# Patient Record
Sex: MALE | Race: WHITE | NOT HISPANIC OR LATINO | Employment: FULL TIME | URBAN - METROPOLITAN AREA
[De-identification: names, ages, dates, MRNs, and addresses within clinical notes are randomized per-mention and may not be internally consistent; named-entity substitution may affect disease eponyms.]

---

## 2023-02-08 PROCEDURE — 87086 URINE CULTURE/COLONY COUNT: CPT | Performed by: STUDENT IN AN ORGANIZED HEALTH CARE EDUCATION/TRAINING PROGRAM

## 2023-02-08 PROCEDURE — U0004 COV-19 TEST NON-CDC HGH THRU: HCPCS | Performed by: STUDENT IN AN ORGANIZED HEALTH CARE EDUCATION/TRAINING PROGRAM

## 2023-02-09 ENCOUNTER — TELEPHONE (OUTPATIENT)
Dept: URGENT CARE | Facility: CLINIC | Age: 28
End: 2023-02-09
Payer: MEDICAID

## 2023-02-09 NOTE — TELEPHONE ENCOUNTER
----- Message from John Calvin Cooksey, PA-C sent at 2/9/2023  9:35 AM EST -----  Please call the patient regarding his negative COVID PCR test.     Thank you,     -John Cooksey, PA-C

## 2023-02-10 ENCOUNTER — TELEPHONE (OUTPATIENT)
Dept: URGENT CARE | Facility: CLINIC | Age: 28
End: 2023-02-10
Payer: MEDICAID

## 2023-02-10 NOTE — TELEPHONE ENCOUNTER
Finger Sprain  A sprain is a stretching or tearing of the ligaments that hold a joint together. There are no broken bones. Sprains take 3 to 6 weeks to heal.  A sprained finger may be treated with a splint or buddy tape. This is when you tape the injured finger to the one next to it for support. Minor sprains may require no additional support.  Home care  · Keep your hand elevated to reduce pain and swelling. This is very important during the first 48 hours.  · Apply an ice pack over the injured area for 15 to 20 minutes every 3 to 6 hours. You should do this for the first 24 to 48 hours. You can make an ice pack by filling a plastic bag that seals at the top with ice cubes and then wrapping it with a thin towel. Continue the use of ice packs for relief of pain and swelling as needed. As the ice melts, be careful to avoid getting any wrap or splint wet. After 48 hours, apply heat (warm shower or warm bath) for 15 to 20 minutes several times a day, or alternate ice and heat.  · If buddy tape was applied and it becomes wet or dirty, change it. You may replace it with paper, plastic or cloth tape. Cloth tape and paper tapes must be kept dry. Apply gauze or cotton padding between the fingers, especially at the webbed space. This will help prevent the skin from getting moist and breaking down. Keep the buddy tape in place for at least 4 weeks, or as instructed by your healthcare provider.  · If a splint was applied, wear it for the time advised.  · You may use over-the-counter pain medicine to control pain, unless another pain medicine was prescribed. If you have chronic liver or kidney disease or ever had a stomach ulcer or GI bleeding, talk with your healthcare provider before using these medicines.  Follow-up care  Follow up with your healthcare provider as directed. Finger joints will become stiff if immobile for too long. If a splint was applied, ask your healthcare provider when it is safe to begin  ----- Message from ELIAZAR Sumner sent at 2/10/2023  9:42 AM EST -----  Please call the patient regarding his normal result.    Please inform patient that his urine culture has came back with no growth which means negative.    If patient is continuing to have symptoms or other concerns persist he should follow-up with his primary care provider which is listed as Lisset López.   range-of-motion exercises.  Sometimes fractures don’t show up on the first X-ray. Bruises and sprains can sometimes hurt as much as a fracture. These injuries can take time to heal completely. If your symptoms don’t improve or they get worse, talk with your healthcare provider. You may need a repeat X-ray. If X-rays were taken, you will be told of any new findings that may affect your care.  When to seek medical advice  Call your healthcare provider right away if any of these occur:  · Pain or swelling increases  · Fingers or hand becomes cold, blue, numb, or tingly  Date Last Reviewed: 11/20/2015 © 2000-2018 [employer]. [address] [place] [address], [place] [address]. All rights reserved. This information is not intended as a substitute for professional medical care. Always follow your healthcare professional's instructions.            Head Injury (Adult)    You have a head injury. It does not appear serious at this time. But symptoms of a more serious problem, such as a mild brain injury (concussion) or bruising or bleeding in the brain, may appear later. For this reason, you or someone caring for you will need to watch for the symptoms listed below. Once you’re home, also be sure to follow any care instructions you’re given.  Home care  Watch for the following symptoms  Seek emergency medical care if you have any of these symptoms over the next hours to days:   · Headache  · Nausea or vomiting  · Dizziness  · Sensitivity to light or noise  · Unusual sleepiness or grogginess  · Trouble falling asleep  · Personality changes  · Vision changes  · Memory loss  · Confusion  · Trouble walking or clumsiness  · Loss of consciousness (even for a short time)  · Inability to be awakened  · Stiff neck  · Weakness or numbness in any part of the body  · Seizures  General care  · If you were prescribed medicines for pain, use them as directed. Note: Don’t take other medicines for pain without talking to your provider  first.  · To help reduce swelling and pain, apply a cold source to the injured area for up to 20 minutes at a time. Do this as often as directed. Use a cold pack or bag of ice wrapped in a thin towel. Never apply a cold source directly to the skin.  · If you have cuts or scrapes as a result of your head injury, care for them as directed.  · For the next 24 hours (or longer, if instructed):  ? Don’t drink alcohol or use sedatives or other medicines that make you sleepy.  ? Don’t drive or operate machinery.  ? Don’t do anything strenuous, such as heavy lifting or straining.  ? Limit tasks that require concentration. This includes reading, using a smartphone or computer, watching TV, and playing video games.  ? Don’t return to sports or other activities that could result in another head injury.  Follow-up care  Follow up with your healthcare provider, or as directed. If imaging tests were done, they will be reviewed by a doctor. You will be told the results and any new findings that may affect your care.  When to seek medical advice  Call your healthcare provider right away if any of these occur:  · Pain doesn’t get better or worsens  · New or increased swelling or bruising  · Fever of 100.4°F (38°C) or higher, or as directed by your provider  · Increased redness, warmth, drainage, or bleeding from the injured area  · Fluid drainage or bleeding from the nose or ears  · Any depression or bony abnormality in the injured area  Date Last Reviewed: 9/26/2015  © 0247-1268 [employer]. [address] [place] [address], [place] [address]. All rights reserved. This information is not intended as a substitute for professional medical care. Always follow your healthcare professional's instructions.        ACE Wrap  Minor muscle or joint injuries are often treated with an elastic bandage. The bandage provides support and compression to the injured area. An elastic bandage is a stretchy, rolled bandage. Elastic bandages range  in width from 2 to 6 inches. They can be used for a variety of injuries. The bandages are often called ACE bandages, after the most common brand name.  If used correctly, elastic bandages help control swelling and ease pain. An elastic bandage is also a good reminder not to overuse the injured area. However, elastic bandages do not provide a lot of support and will not prevent reinjury.  Home care    To apply an elastic bandage:  · Check the skin before wrapping the injury. It should be clean, dry, and free of drainage.  · Start wrapping below the injury and work your way toward the body. For an ankle sprain, start wrapping around the foot and work up toward the calf. This will help control swelling.  · Overlap the edges of the bandage so it stays snuggly in place.  · Wrap the bandage firmly, but not too tightly. A tight bandage can increase swelling on either end of the bandage. Make sure the bandage is wrinkle free.  · Leave fingers and toes exposed.  · Secure ends of the bandage (even self-sticking ones) with clips or tape.  · Check frequently to ensure adequate circulation, especially in the fingers and toes. Loosen the bandage if there is local swelling, numbness, tingling, discomfort, coldness, or discoloration (skin pale or bluish in color).  · Rewrap the bandage as needed during the day. Reroll the bandage as you unwind it.  Continue using the elastic bandage until the pain and swelling are gone or as your healthcare provider advises.  If you have been told to ice the area, the ice can be secured in place with the elastic bandage. Wrap the ice pack with a thin towel to protect the skin. Do not put ice or an ice pack directly on the skin.  Ice the area for no more than 20 minutes at a time.    Follow-up care  Follow up with your healthcare provider, as advised.  When to seek medical advice  Call your healthcare provider for any of the following:  · Pain and swelling that doesn't get better or gets  worse  · Trouble moving injured area  · Skin discoloration, numbness, or tingling that doesn’t go away after bandage is removed  Date Last Reviewed: 9/13/2015  © 3637-0034 [employer], [employer]. [address] [place] [address], [place] [address]. All rights reserved. This information is not intended as a substitute for professional medical care. Always follow your healthcare professional's instructions.

## 2023-02-11 ENCOUNTER — HOSPITAL ENCOUNTER (EMERGENCY)
Facility: HOSPITAL | Age: 28
Discharge: HOME | End: 2023-02-11
Attending: EMERGENCY MEDICINE | Admitting: EMERGENCY MEDICINE
Payer: MEDICAID

## 2023-02-11 ENCOUNTER — APPOINTMENT (OUTPATIENT)
Dept: GENERAL RADIOLOGY | Facility: HOSPITAL | Age: 28
End: 2023-02-11
Payer: MEDICAID

## 2023-02-11 VITALS
SYSTOLIC BLOOD PRESSURE: 124 MMHG | TEMPERATURE: 98.4 F | HEART RATE: 81 BPM | HEIGHT: 71 IN | BODY MASS INDEX: 32.56 KG/M2 | WEIGHT: 232.59 LBS | OXYGEN SATURATION: 99 % | DIASTOLIC BLOOD PRESSURE: 85 MMHG | RESPIRATION RATE: 16 BRPM

## 2023-02-11 DIAGNOSIS — R19.7 DIARRHEA, UNSPECIFIED TYPE: ICD-10-CM

## 2023-02-11 DIAGNOSIS — R11.0 NAUSEA: ICD-10-CM

## 2023-02-11 DIAGNOSIS — R51.9 INTRACTABLE HEADACHE, UNSPECIFIED CHRONICITY PATTERN, UNSPECIFIED HEADACHE TYPE: Primary | ICD-10-CM

## 2023-02-11 LAB
FLUAV AG NPH QL: NEGATIVE
FLUBV AG NPH QL IA: NEGATIVE
S PYO AG THROAT QL: NEGATIVE
SARS-COV-2 RNA RESP QL NAA+PROBE: NOT DETECTED

## 2023-02-11 PROCEDURE — 99283 EMERGENCY DEPT VISIT LOW MDM: CPT

## 2023-02-11 PROCEDURE — 25010000002 DIPHENHYDRAMINE PER 50 MG

## 2023-02-11 PROCEDURE — 87081 CULTURE SCREEN ONLY: CPT | Performed by: EMERGENCY MEDICINE

## 2023-02-11 PROCEDURE — 96361 HYDRATE IV INFUSION ADD-ON: CPT

## 2023-02-11 PROCEDURE — U0004 COV-19 TEST NON-CDC HGH THRU: HCPCS | Performed by: EMERGENCY MEDICINE

## 2023-02-11 PROCEDURE — 25010000002 PROCHLORPERAZINE 10 MG/2ML SOLUTION

## 2023-02-11 PROCEDURE — 71045 X-RAY EXAM CHEST 1 VIEW: CPT

## 2023-02-11 PROCEDURE — 87880 STREP A ASSAY W/OPTIC: CPT | Performed by: EMERGENCY MEDICINE

## 2023-02-11 PROCEDURE — 96374 THER/PROPH/DIAG INJ IV PUSH: CPT

## 2023-02-11 PROCEDURE — 25010000002 KETOROLAC TROMETHAMINE PER 15 MG

## 2023-02-11 PROCEDURE — 96375 TX/PRO/DX INJ NEW DRUG ADDON: CPT

## 2023-02-11 PROCEDURE — 87804 INFLUENZA ASSAY W/OPTIC: CPT | Performed by: EMERGENCY MEDICINE

## 2023-02-11 PROCEDURE — C9803 HOPD COVID-19 SPEC COLLECT: HCPCS | Performed by: EMERGENCY MEDICINE

## 2023-02-11 RX ORDER — NAPROXEN 500 MG/1
500 TABLET ORAL 2 TIMES DAILY PRN
Qty: 30 TABLET | Refills: 0 | Status: SHIPPED | OUTPATIENT
Start: 2023-02-11

## 2023-02-11 RX ORDER — DIPHENHYDRAMINE HYDROCHLORIDE 50 MG/ML
12.5 INJECTION INTRAMUSCULAR; INTRAVENOUS ONCE
Status: COMPLETED | OUTPATIENT
Start: 2023-02-11 | End: 2023-02-11

## 2023-02-11 RX ORDER — KETOROLAC TROMETHAMINE 30 MG/ML
30 INJECTION, SOLUTION INTRAMUSCULAR; INTRAVENOUS ONCE
Status: COMPLETED | OUTPATIENT
Start: 2023-02-11 | End: 2023-02-11

## 2023-02-11 RX ORDER — PROCHLORPERAZINE EDISYLATE 5 MG/ML
10 INJECTION INTRAMUSCULAR; INTRAVENOUS ONCE
Status: COMPLETED | OUTPATIENT
Start: 2023-02-11 | End: 2023-02-11

## 2023-02-11 RX ORDER — BUTALBITAL, ACETAMINOPHEN AND CAFFEINE 300; 40; 50 MG/1; MG/1; MG/1
1 CAPSULE ORAL ONCE
Status: COMPLETED | OUTPATIENT
Start: 2023-02-11 | End: 2023-02-11

## 2023-02-11 RX ADMIN — KETOROLAC TROMETHAMINE 30 MG: 30 INJECTION, SOLUTION INTRAMUSCULAR; INTRAVENOUS at 13:00

## 2023-02-11 RX ADMIN — BUTALBITAL, ACETAMINOPHEN AND CAFFEINE 1 CAPSULE: 300; 40; 50 CAPSULE ORAL at 14:36

## 2023-02-11 RX ADMIN — DIPHENHYDRAMINE HYDROCHLORIDE 12.5 MG: 50 INJECTION, SOLUTION INTRAMUSCULAR; INTRAVENOUS at 12:58

## 2023-02-11 RX ADMIN — PROCHLORPERAZINE EDISYLATE 10 MG: 5 INJECTION INTRAMUSCULAR; INTRAVENOUS at 12:56

## 2023-02-11 RX ADMIN — SODIUM CHLORIDE 1000 ML: 9 INJECTION, SOLUTION INTRAVENOUS at 12:55

## 2023-02-11 NOTE — ED PROVIDER NOTES
Emergency Department Encounter    Date seen: 2/11/2023  Time: 12:27 PM EST    Room number: 13/13     Chief Complaint: flu like symptoms     HPI    History of Present Illness:  Patient is a 27 y.o. year old male who presents to the emergency department for evaluation of flulike symptoms, cough, fever, and a headache.  Patient reports he has had a dull headache for 3 weeks.  He describes it as a pressure sensation behind his eyes.  He states his fever has reached as high as 102.0.    Independent Historian/Clinical History and Information was obtained by:   Patient     History is limited by: N/A      PCP: Lisset López APRN        Past Medical History:     Allergies   Allergen Reactions   • Ceclor [Cefaclor] Unknown - Low Severity     Not sure what his reaction is, had as a kid.     Past Medical History:   Diagnosis Date   • ADHD      Past Surgical History:   Procedure Laterality Date   • HERNIA REPAIR       History reviewed. No pertinent family history.    Home Medications:  Prior to Admission medications    Medication Sig Start Date End Date Taking? Authorizing Provider   amphetamine-dextroamphetamine (ADDERALL) 20 MG tablet Take 20 mg by mouth Daily.    Provider, MD Lashanda   methylPREDNISolone (MEDROL) 4 MG dose pack Take as directed on package instructions. 2/8/23   Cooksey, John Calvin, PA-C        Social History:   Social History     Tobacco Use   • Smoking status: Some Days     Types: Cigarettes   • Smokeless tobacco: Never   Vaping Use   • Vaping Use: Never used   Substance Use Topics   • Alcohol use: Yes     Comment: on occ       All labs were reviewed and interpreted by me.      Review of Systems:  Review of Systems   Constitutional: Negative for chills and fever.   HENT: Negative for congestion, ear pain and sore throat.    Eyes: Positive for photophobia. Negative for pain.   Respiratory: Positive for cough. Negative for chest tightness and shortness of breath.    Cardiovascular: Negative for chest  "pain.   Gastrointestinal: Positive for diarrhea and nausea. Negative for abdominal pain and vomiting.   Genitourinary: Negative for flank pain and hematuria.   Musculoskeletal: Negative for joint swelling.   Skin: Negative for pallor.   Neurological: Positive for headaches. Negative for seizures.   All other systems reviewed and are negative.       Physical Exam:  /85 (BP Location: Left arm, Patient Position: Lying)   Pulse 81   Temp 98.4 °F (36.9 °C) (Oral)   Resp 16   Ht 180.3 cm (70.98\")   Wt 105 kg (232 lb 9.4 oz)   SpO2 99%   BMI 32.45 kg/m²     Physical Exam  Vitals and nursing note reviewed.   Constitutional:       General: He is not in acute distress.     Appearance: Normal appearance. He is not ill-appearing, toxic-appearing or diaphoretic.   HENT:      Head: Normocephalic and atraumatic.      Mouth/Throat:      Mouth: Mucous membranes are moist.   Eyes:      General: No scleral icterus.     Extraocular Movements: Extraocular movements intact.      Pupils: Pupils are equal, round, and reactive to light.   Cardiovascular:      Rate and Rhythm: Normal rate and regular rhythm.      Pulses: Normal pulses.      Heart sounds: Normal heart sounds.   Pulmonary:      Effort: Pulmonary effort is normal. No respiratory distress.      Breath sounds: Normal breath sounds. No stridor. No wheezing, rhonchi or rales.   Chest:      Chest wall: No tenderness.   Abdominal:      General: Abdomen is flat. There is no distension.      Palpations: Abdomen is soft.      Tenderness: There is no abdominal tenderness.   Musculoskeletal:         General: Normal range of motion.      Cervical back: Normal range of motion and neck supple.   Skin:     General: Skin is warm and dry.      Coloration: Skin is not jaundiced or pale.      Findings: No bruising, erythema, lesion or rash.   Neurological:      General: No focal deficit present.      Mental Status: He is alert and oriented to person, place, and time. Mental status is " at baseline.      Sensory: No sensory deficit.      Motor: No weakness.      Coordination: Coordination normal.   Psychiatric:         Mood and Affect: Mood normal.         Behavior: Behavior normal.         Thought Content: Thought content normal.         Judgment: Judgment normal.                  Procedures:  Procedures      Medical Decision Making:      Comorbidities that affect care:    pt has history of headaches    External Notes reviewed:    Previous Clinic Note: Patient was seen at a local urgent care center on 2/8/2023, 3 days ago for suprapubic tenderness.  He was also seen for a cough at that facility.      The following orders were placed and all results were independently analyzed by me:  Orders Placed This Encounter   Procedures   • Influenza Antigen, Rapid - Swab, Nasopharynx   • Rapid Strep A Screen - Swab, Throat   • COVID-19,APTIMA PANTHER(SANDRA),[facility], NP/OP SWAB IN UTM/VTM/SALINE TRANSPORT MEDIA,24 HR TAT - Swab, Nasopharynx   • Beta Strep Culture, Throat - Swab, Throat   • XR Chest 1 View       Medications Given in the Emergency Department:  Medications   ketorolac (TORADOL) injection 30 mg (30 mg Intravenous Given 2/11/23 1300)   prochlorperazine (COMPAZINE) injection 10 mg (10 mg Intravenous Given 2/11/23 1256)   diphenhydrAMINE (BENADRYL) injection 12.5 mg (12.5 mg Intravenous Given 2/11/23 1258)   sodium chloride 0.9 % bolus 1,000 mL (0 mL Intravenous Stopped 2/11/23 1437)   butalbital-acetaminophen-caffeine (ORBIVAN) -40 MG capsule 1 capsule (1 capsule Oral Given 2/11/23 1436)        ED Course:    ED Course as of 02/11/23 1606   Sat Feb 11, 2023   1327 Patient was sleeping upon me entering the room for reevaluation.  He was easy to arouse.  Once awake he reports minimal decrease in pain. [MS]   1537 Pt now rates his pain as a 2. [MS]      ED Course User Index  [MS] Narcisa López APRN       Labs:    Lab Results (last 24 hours)     Procedure Component Value Units  Date/Time    Influenza Antigen, Rapid - Swab, Nasopharynx [[id number]]  (Normal) Collected: 02/11/23 1229    Specimen: Swab from Nasopharynx Updated: 02/11/23 1305     Influenza A Ag, EIA Negative     Influenza B Ag, EIA Negative    Rapid Strep A Screen - Swab, Throat [[id number]]  (Normal) Collected: 02/11/23 1229    Specimen: Swab from Throat Updated: 02/11/23 1249     Strep A Ag Negative    COVID-19,APTIMA PANTHER(SANDRA),[facility], NP/OP SWAB IN UTM/VTM/SALINE TRANSPORT MEDIA,24 HR TAT - Swab, Nasopharynx [[id number]] Collected: 02/11/23 1229    Specimen: Swab from Nasopharynx Updated: 02/11/23 1231    Beta Strep Culture, Throat - Swab, Throat [[id number]] Collected: 02/11/23 1229    Specimen: Swab from Throat Updated: 02/11/23 1249           Imaging:    XR Chest 1 View    Result Date: 2/11/2023  PROCEDURE: XR CHEST 1 VW  COMPARISON: None  INDICATIONS: INTERMITTENT FEVER, WEAKNESS  FINDINGS:  LUNGS: Normal.  No significant pulmonary parenchymal abnormalities.  VASCULATURE: Normal.  Unremarkable pulmonary vasculature.  CARDIAC: Normal.  No cardiac silhouette abnormality or cardiomegaly.  MEDIASTINUM: Normal.  No visible mass or adenopathy.  PLEURA: Normal.  No effusion or pleural thickening.  BONES: Normal.  No fracture or visible bony lesion.  OTHER: Negative.        Normal examination.        BALAJI JHA MD       Electronically Signed and Approved By: BALAJI JHA MD on 2/11/2023 at 13:36                 Differential Diagnosis and Discussion:    Headache: Differential diagnosis includes but is not limited to migraine, cluster headache, hypertension, tumor, subarachnoid bleeding, pseudotumor cerebri, temporal arteritis, infections, tension headache, and TMJ syndrome.        Patient Care Considerations:    CT HEAD: I considered ordering a noncontrast CT of the head, however Patient has no neurodeficits, no unilateral weakness, no head trauma, and additionally his headache is lasted for 3 weeks and he has a  history of having headaches      Consultants/Shared Management Plan:    None    Social Determinants of Health:    Patient is independent, reliable, and has access to care.       Disposition and Care Coordination:    Discharged: The patient is suitable and stable for discharge with no need for consideration of observation or admission.        MDM  Number of Diagnoses or Management Options  Diarrhea, unspecified type: new and does not require workup  Intractable headache, unspecified chronicity pattern, unspecified headache type: new and does not require workup  Nausea: new and does not require workup     Amount and/or Complexity of Data Reviewed  Clinical lab tests: reviewed and ordered  Tests in the radiology section of CPT®: reviewed and ordered  Review and summarize past medical records: yes (I have personally reviewed patient's previous medical encounters.  )    Risk of Complications, Morbidity, and/or Mortality  Presenting problems: low  Diagnostic procedures: low  Management options: low    Patient Progress  Patient progress: stable       Final diagnoses:   Intractable headache, unspecified chronicity pattern, unspecified headache type   Nausea   Diarrhea, unspecified type        ED Disposition     ED Disposition   Discharge    Condition   Stable    Comment   --             This medical record created using voice recognition software.                     Narcisa López, APRN  02/11/23 4924

## 2023-02-13 LAB — BACTERIA SPEC AEROBE CULT: NORMAL

## 2023-06-18 ENCOUNTER — TELEPHONE (OUTPATIENT)
Dept: URGENT CARE | Facility: CLINIC | Age: 28
End: 2023-06-18
Payer: MEDICAID

## 2023-06-18 DIAGNOSIS — H66.001 NON-RECURRENT ACUTE SUPPURATIVE OTITIS MEDIA OF RIGHT EAR WITHOUT SPONTANEOUS RUPTURE OF TYMPANIC MEMBRANE: Primary | ICD-10-CM

## 2023-06-18 RX ORDER — AZITHROMYCIN 250 MG/1
TABLET, FILM COATED ORAL
Qty: 6 TABLET | Refills: 0 | Status: SHIPPED | OUTPATIENT
Start: 2023-06-18

## 2023-10-18 ENCOUNTER — TELEPHONE (OUTPATIENT)
Dept: OTOLARYNGOLOGY | Facility: CLINIC | Age: 28
End: 2023-10-18

## 2023-10-18 NOTE — TELEPHONE ENCOUNTER
[facility] received a fax that requires your attention. The document has been indexed to the patient’s chart for your review.      Reason for sending: FOR PROVIDER REVIEW    Documents Description: CT SINUS 10/12/2023    Name of Sender: [facility]    Date Indexed: 10/12/2023    Notes (if needed):

## 2023-12-11 ENCOUNTER — OFFICE VISIT (OUTPATIENT)
Dept: OTOLARYNGOLOGY | Facility: CLINIC | Age: 28
End: 2023-12-11
Payer: COMMERCIAL

## 2023-12-11 ENCOUNTER — PREP FOR SURGERY (OUTPATIENT)
Dept: OTHER | Facility: HOSPITAL | Age: 28
End: 2023-12-11
Payer: COMMERCIAL

## 2023-12-11 VITALS — BODY MASS INDEX: 30.35 KG/M2 | HEIGHT: 71 IN | WEIGHT: 216.8 LBS | TEMPERATURE: 97.6 F

## 2023-12-11 DIAGNOSIS — J32.9 CHRONIC SINUSITIS, UNSPECIFIED LOCATION: Primary | ICD-10-CM

## 2023-12-11 DIAGNOSIS — J34.89 CONCHA BULLOSA: ICD-10-CM

## 2023-12-11 DIAGNOSIS — J30.89 ALLERGIC RHINITIS DUE TO OTHER ALLERGIC TRIGGER, UNSPECIFIED SEASONALITY: ICD-10-CM

## 2023-12-11 DIAGNOSIS — H57.11 EYE PAIN, RIGHT: ICD-10-CM

## 2023-12-11 DIAGNOSIS — J34.3 HYPERTROPHY OF BOTH INFERIOR NASAL TURBINATES: ICD-10-CM

## 2023-12-11 DIAGNOSIS — J34.2 DEVIATED NASAL SEPTUM: ICD-10-CM

## 2023-12-11 DIAGNOSIS — R09.81 CHRONIC NASAL CONGESTION: ICD-10-CM

## 2023-12-11 DIAGNOSIS — J32.9 CHRONIC SINUSITIS, UNSPECIFIED LOCATION: ICD-10-CM

## 2023-12-11 DIAGNOSIS — Z01.818 PREOP TESTING: Primary | ICD-10-CM

## 2023-12-11 DIAGNOSIS — R09.81 CHRONIC NASAL CONGESTION: Primary | ICD-10-CM

## 2023-12-11 PROBLEM — J30.9 ALLERGIC RHINITIS: Status: ACTIVE | Noted: 2023-12-11

## 2023-12-11 PROCEDURE — 99204 OFFICE O/P NEW MOD 45 MIN: CPT | Performed by: OTOLARYNGOLOGY

## 2023-12-11 RX ORDER — CETIRIZINE HCL, PSEUDOEPHEDRINE HCL 5; 120 MG/1; MG/1
TABLET, EXTENDED RELEASE ORAL
COMMUNITY
Start: 2023-08-15

## 2023-12-11 RX ORDER — OXYMETAZOLINE HYDROCHLORIDE 0.05 G/100ML
2 SPRAY NASAL 2 TIMES DAILY
OUTPATIENT
Start: 2024-01-05 | End: 2024-01-06

## 2023-12-11 RX ORDER — DEXTROAMPHETAMINE SACCHARATE, AMPHETAMINE ASPARTATE, DEXTROAMPHETAMINE SULFATE AND AMPHETAMINE SULFATE 2.5; 2.5; 2.5; 2.5 MG/1; MG/1; MG/1; MG/1
TABLET ORAL EVERY 12 HOURS SCHEDULED
COMMUNITY

## 2023-12-11 RX ORDER — TOBRAMYCIN 3 MG/ML
SOLUTION/ DROPS OPHTHALMIC
COMMUNITY
Start: 2023-06-26

## 2023-12-11 RX ORDER — CITALOPRAM 20 MG/1
1 TABLET ORAL DAILY
COMMUNITY
Start: 2023-11-27

## 2023-12-11 NOTE — PATIENT INSTRUCTIONS
1.  Patient's chronic sinusitis symptoms come from the nasal airway issues caused by deviated nasal septum, hi bullosa, bilateral large inferior turbinates.  Also on top of this patient has a significant valve collapse which is adding to his nasal airway issues.  Therefore I recommend patient have septoplasty, bilateral inferior turbinate submucous resection with cauterization, endoscopic hi bullectomy and of course nasal valve repair with Latera implants.    SEPTOPLASTY, NASAL VALVE REPAIR AND TURBINOPLASTY: A septoplasty and inferior turbinoplasty were recommended. The risks and benefits were explained including but not limited to pain, bleeding, infection, implant failure or extrusion, risks of the general anesthesia, continued septal deviation, crusting, congestion and septal perforation. Possibilities of continued preoperative symptoms and the possible need for revision surgery and or medical therapy were discussed. Alternatives were discussed. No guarantees were made or implied. Questions were asked appropriately answered.     2.  Patient to have his blood work done prior to surgery.

## 2023-12-11 NOTE — PROGRESS NOTES
Patient Name: Mario King   Visit Date: 12/11/2023   Patient ID: [id number]  Provider: Ortiz Wilcox MD    Sex: male  Location: [facility]   YOB: 1995  Location Address: [address] [place] [address], [address] [place] [address],?[place]?[id number]    Primary Care Provider Lisset López APRN  Location Phone: [phone]    Referring Provider: No ref. provider found        Chief Complaint  Sinus Problem    History of Present Illness  Mario King is a 28 y.o. male who presents to [facility] for Sinus Problem.  Patient reports that he has been having chronic sinusitis for almost all his adult life.  Patient reports that he has had nasal congestion always seem to be worse on the right compared to the left.  He also has thick drainage that is usually brown or yellowish in color.  He tries sinus rinses including nebs over the years.  Patient also has been treated for infections with antibiotics and most recently in the past year he has had 8 infections that he actually knows he has been treated for.  Also he has some allergies which is mild but certainly it includes on prior testing positive for ragweed [place] grass cedar and [place] trees.  CT scan of the sinuses was obtained on October 12, 2023 at the [facility].  Repairs on that particular CT scan the sinuses were clear although he had deviated nasal septum, hi bullosa, inferior turbinate hypertrophy but no evidence of any sinus disease.  Also personally reviewed the CT scan which confirms the above findings of basically having a deviated nasal septum which is a largely due to hi bullosa and then of course the large inferior turbinate.    Past Medical History:   Diagnosis Date    ADHD        Past Surgical History:   Procedure Laterality Date    HERNIA REPAIR           Current Outpatient Medications:     amphetamine-dextroamphetamine (ADDERALL) 20 MG tablet, Take 1 tablet by mouth 2 (Two) Times a Day.,  "Disp: , Rfl:     citalopram (CeleXA) 20 MG tablet, Take 1 tablet by mouth Daily., Disp: , Rfl:     ibuprofen (ADVIL,MOTRIN) 200 MG tablet, Take 1 tablet by mouth Every 6 (Six) Hours As Needed for Mild Pain., Disp: , Rfl:     prazosin (MINIPRESS) 2 MG capsule, Take 1 capsule by mouth every night at bedtime., Disp: , Rfl:     tobramycin 0.3 % solution ophthalmic solution, 1 gtt in each affected eye Three Times daily for 10 day(s), Disp: , Rfl:     amphetamine-dextroamphetamine (Adderall) 10 MG tablet, Every 12 (Twelve) Hours. (Patient not taking: Reported on 12/11/2023), Disp: , Rfl:     Dextromethorphan-guaiFENesin 5-100 MG/5ML liquid, Every 4 (Four) Hours. (Patient not taking: Reported on 12/11/2023), Disp: , Rfl:     diphenhydrAMINE (BENADRYL) 25 mg capsule, Take 1 capsule by mouth Every 6 (Six) Hours As Needed for Itching. (Patient not taking: Reported on 12/11/2023), Disp: , Rfl:     GNP All Day Allergy-D 5-120 MG per 12 hr tablet, TAKE 1 TABLET BY MOUTH EVERY MORNING AND TAKE 1 TABLET BY MOUTH EVERY EVENING (Patient not taking: Reported on 12/11/2023), Disp: , Rfl:      Allergies   Allergen Reactions    Ceclor [Cefaclor] Unknown - Low Severity     Not sure what his reaction is, had as a kid.       Social History     Tobacco Use    Smoking status: Never     Passive exposure: Past    Smokeless tobacco: Never   Vaping Use    Vaping Use: Never used   Substance Use Topics    Alcohol use: Yes     Comment: on occ    Drug use: Never        Objective     Vital Signs:   Vitals:    12/11/23 1314   Temp: 97.6 °F (36.4 °C)   TempSrc: Temporal   Weight: 98.3 kg (216 lb 12.8 oz)   Height: 179.1 cm (70.5\")       Tobacco Use: Low Risk  (12/11/2023)    Patient History     Smoking Tobacco Use: Never     Smokeless Tobacco Use: Never     Passive Exposure: Past         Physical Exam    Constitutional   Appearance  well developed, well-nourished, alert and in no acute distress, voice clear and strong    Head   Inspection  no " deformities or lesions      Face   Inspection  no facial lesions; House-Brackmann I/VI bilaterally   Palpation  no TMJ crepitus nor  muscle tenderness bilaterally     Eyes/Vision   Visual Fields  extraocular movements are intact, no spontaneous or gaze-induced nystagmus  Conjunctivae  clear   Sclerae  clear   Pupils and Irises  pupils equal, round, and reactive to light.   Nystagmus  not present     Ears, Nose, Mouth and Throat  Ears  External Ears  appearance within normal limits, no lesions present   Otoscopic Examination  tympanic membrane appearance within normal limits bilaterally without perforations, well-aerated middle ears   Hearing  intact to conversational voice both ears   Tunning fork testing    Rinne:  Barney:    Nose  External Nose  appearance normal   Intranasal Exam  Nasal tip caudally is deviated to the left of the columella.  Otherwise septum is deviated with significant congestion of both nostrils with enlarged turbinates.  Modified Rita Test: Positive test with a significant nasal valve collapse due to a very deficient upper lateral cartilages.    Oral Cavity  Oral Mucosa  oral mucosa normal without pallor or cyanosis   Lips  lip appearance normal   Teeth  normal dentition for age   Gums  gums pink, non-swollen, no bleeding present   Tongue  tongue appearance normal; normal mobility   Palate  hard palate normal, soft palate appearance normal with symmetric mobility     Throat  Oropharynx  no inflammation or lesions present, tonsils within normal limits   Hypopharynx  appearance within normal limits   Larynx  voice normal     Neck  Inspection/Palpation  normal appearance, no masses or tenderness, trachea midline; thyroid size normal, nontender, no nodules or masses present on palpation     Lymphatic  Neck  no lymphadenopathy present   Supraclavicular Nodes  no lymphadenopathy present   Preauricular Nodes  no lymphadenopathy present     Respiratory  Respiratory Effort  breathing  "unlabored   Inspection of Chest  normal appearance, no retractions     Musculoskeletal   Cervical back: Normal range of motion and neck supple.      Skin and Subcutaneous Tissue  General Inspection  Regarding face and neck - there are no rashes present, no lesions present, and no areas of discoloration     Neurologic  Cranial Nerves  cranial nerves II-XII are grossly intact bilaterally   Gait and Station  normal gait, able to stand without diffculty    Psychiatric  Judgement and Insight  judgment and insight intact   Mood and Affect  mood normal, affect appropriate       RESULTS REVIEWED    I have reviewed the following information:   []  Previous Internal Note  [x]  Previous External Note:   [x]  Ordered Tests & Results:      Pathology:      No results found for: \"TSH\", \"T3FREE\", \"FREET4\", \"PTH\", \"THYROGLB\", \"CALCIUM\", \"KAML12ZJ\", \"THYRSTIMIMMU\", \"THYROIDAB\"    No Images in the past 120 days found..    I have discussed the interpretation of the above results with the patient.    Procedures          Assessment and Plan   Diagnoses and all orders for this visit:    1. Chronic sinusitis, unspecified location (Primary)    2. Eye pain, right    3. Chronic nasal congestion    4. Deviated nasal septum    5. Hi bullosa    6. Allergic rhinitis due to other allergic trigger, unspecified seasonality        Mario King  reports that he has never smoked. He has been exposed to tobacco smoke. He has never used smokeless tobacco. I have educated him on the risk of diseases from using tobacco products such as Cancer, COPD & Heart Disease.       Plan:  Patient Instructions   1.  Patient's chronic sinusitis symptoms come from the nasal airway issues caused by deviated nasal septum, hi bullosa, bilateral large inferior turbinates.  Also on top of this patient has a significant valve collapse which is adding to his nasal airway issues.  Therefore I recommend patient have septoplasty, bilateral inferior turbinate submucous " resection with cauterization, endoscopic hi bullectomy and of course nasal valve repair with Latera implants.    SEPTOPLASTY, NASAL VALVE REPAIR AND TURBINOPLASTY: A septoplasty and inferior turbinoplasty were recommended. The risks and benefits were explained including but not limited to pain, bleeding, infection, implant failure or extrusion, risks of the general anesthesia, continued septal deviation, crusting, congestion and septal perforation. Possibilities of continued preoperative symptoms and the possible need for revision surgery and or medical therapy were discussed. Alternatives were discussed. No guarantees were made or implied. Questions were asked appropriately answered.     2.  Patient to have his blood work done prior to surgery.     55 minutes were spent caring for Mario on this date of service. This time spent by me includes preparing for the visit, reviewing tests, obtaining/reviewing separately obtained history, performing medically appropriate exam/evaluation, counseling/educating the patient/family/caregiver, ordering medications/tests/procedures, referring/communicating with other health care professionals, documenting information in the medical record, independently interpreting results and communicating that with the patient/family/caregiver and/or care coordination.       Follow Up   Return Postop appointment 1 week and 1 month.  Patient was given instructions and counseling regarding his condition or for health maintenance advice. Please see specific information pulled into the AVS if appropriate.

## 2023-12-29 ENCOUNTER — LAB (OUTPATIENT)
Dept: LAB | Facility: HOSPITAL | Age: 28
End: 2023-12-29
Payer: COMMERCIAL

## 2023-12-29 DIAGNOSIS — Z01.818 PREOP TESTING: ICD-10-CM

## 2023-12-29 LAB
APTT PPP: 28.4 SECONDS (ref 24.2–34.2)
BASOPHILS # BLD AUTO: 0.03 10*3/MM3 (ref 0–0.2)
BASOPHILS NFR BLD AUTO: 0.6 % (ref 0–1.5)
DEPRECATED RDW RBC AUTO: 36.7 FL (ref 37–54)
EOSINOPHIL # BLD AUTO: 0.1 10*3/MM3 (ref 0–0.4)
EOSINOPHIL NFR BLD AUTO: 1.9 % (ref 0.3–6.2)
ERYTHROCYTE [DISTWIDTH] IN BLOOD BY AUTOMATED COUNT: 12.3 % (ref 12.3–15.4)
HCT VFR BLD AUTO: 47.1 % (ref 37.5–51)
HGB BLD-MCNC: 15.2 G/DL (ref 13–17.7)
IMM GRANULOCYTES # BLD AUTO: 0.01 10*3/MM3 (ref 0–0.05)
IMM GRANULOCYTES NFR BLD AUTO: 0.2 % (ref 0–0.5)
INR PPP: 0.91 (ref 0.86–1.15)
LYMPHOCYTES # BLD AUTO: 1.99 10*3/MM3 (ref 0.7–3.1)
LYMPHOCYTES NFR BLD AUTO: 38.3 % (ref 19.6–45.3)
MCH RBC QN AUTO: 26.8 PG (ref 26.6–33)
MCHC RBC AUTO-ENTMCNC: 32.3 G/DL (ref 31.5–35.7)
MCV RBC AUTO: 83.1 FL (ref 79–97)
MONOCYTES # BLD AUTO: 0.55 10*3/MM3 (ref 0.1–0.9)
MONOCYTES NFR BLD AUTO: 10.6 % (ref 5–12)
NEUTROPHILS NFR BLD AUTO: 2.52 10*3/MM3 (ref 1.7–7)
NEUTROPHILS NFR BLD AUTO: 48.4 % (ref 42.7–76)
NRBC BLD AUTO-RTO: 0 /100 WBC (ref 0–0.2)
PLATELET # BLD AUTO: 238 10*3/MM3 (ref 140–450)
PMV BLD AUTO: 9.4 FL (ref 6–12)
PROTHROMBIN TIME: 12.4 SECONDS (ref 11.8–14.9)
RBC # BLD AUTO: 5.67 10*6/MM3 (ref 4.14–5.8)
WBC NRBC COR # BLD AUTO: 5.2 10*3/MM3 (ref 3.4–10.8)

## 2023-12-29 PROCEDURE — 85610 PROTHROMBIN TIME: CPT

## 2023-12-29 PROCEDURE — 85025 COMPLETE CBC W/AUTO DIFF WBC: CPT

## 2023-12-29 PROCEDURE — 85730 THROMBOPLASTIN TIME PARTIAL: CPT

## 2023-12-29 PROCEDURE — 36415 COLL VENOUS BLD VENIPUNCTURE: CPT

## 2023-12-29 NOTE — PRE-PROCEDURE INSTRUCTIONS
PATIENT INSTRUCTED TO BE:    - NOTHING TO EAT AFTER MIDNIGHT OR CHEW, EXCEPT CAN HAVE CLEAR LIQUIDS 2 HOURS PRIOR TO SURGERY ARRIVAL TIME     - TO HOLD ALL VITAMINS, SUPPLEMENTS, NSAIDS FOR ONE WEEK PRIOR TO THEIR SURGICAL PROCEDURE    - DO NOT TAKE ____-------------- 7 DAYS PRIOR TO PROCEDURE PER ANESTHESIA RECOMMENDATIONS/INSTRUCTIONS     - INSTRUCTED PT TO USE SURGICAL SOAP 1 TIME THE NIGHT PRIOR TO SURGERY OR THE AM OF SURGERY.   USE SOAP FROM NECK TO TOES AVOID THEIR FACE, HAIR, AND PRIVATE PARTS. INSTRUCTED NO LOTIONS, JEWELRY, PIERCINGS, OR DEODORANT DAY OF SURGERY    - IF DIABETIC, CHECK BLOOD GLUCOSE IF LESS THAN 70 OR HAVING SYMPTOMS CALL THE PREOP AREA FOR INSTRUCTIONS ON AM OF SURGERY ([phone])    -INSTRUCTED TO TAKE THE FOLLOWING MEDICATIONS THE DAY OF SURGERY:             ADDERALL, CELEXA      - DO NOT BRING ANY MEDICATIONS WITH YOU TO THE HOSPITAL THE DAY OF SURGERY, EXCEPT IF USE INHALERS. BRING INHALERS DAY OF SURGERY       - BRING CPAP OR BIPAP TO THE HOSPITAL ONLY IF ARE SPENDING THE NIGHT    - DO NOT SMOKE OR VAPE 24 HOURS PRIOR TO PROCEDURE PER ANESTHESIA REQUEST     -MAKE SURE YOU HAVE A RIDE HOME OR SOMEONE TO STAY WITH YOU THE DAY OF THE PROCEDURE AFTER YOU GO HOME    - FOLLOW ANY OTHER INSTRUCTIONS GIVEN TO YOU BY YOUR SURGEON'S OFFICE.     - PREADMISSION TESTING NURSE- ROOSEVELT HURLEY [phone] IF HAVE ANY QUESTIONS     PATIENT PROVIDED THE NUMBER FOR PREOP SURGICAL DEPT IF HAD QUESTIONS AFTER HOURS PRIOR TO SURGERY ([phone])  INFORMED PT IF NO ANSWER, LEAVE A MESSAGE AND SOMEONE WILL RETURN THEIR CALL       PATIENT VERBALIZED UNDERSTANDING

## 2024-01-04 ENCOUNTER — ANESTHESIA EVENT (OUTPATIENT)
Dept: PERIOP | Facility: HOSPITAL | Age: 29
End: 2024-01-04
Payer: COMMERCIAL

## 2024-01-05 ENCOUNTER — HOSPITAL ENCOUNTER (OUTPATIENT)
Facility: HOSPITAL | Age: 29
Setting detail: HOSPITAL OUTPATIENT SURGERY
Discharge: HOME | End: 2024-01-05
Attending: OTOLARYNGOLOGY | Admitting: OTOLARYNGOLOGY
Payer: COMMERCIAL

## 2024-01-05 ENCOUNTER — ANESTHESIA (OUTPATIENT)
Dept: PERIOP | Facility: HOSPITAL | Age: 29
End: 2024-01-05
Payer: COMMERCIAL

## 2024-01-05 VITALS
TEMPERATURE: 98.4 F | HEIGHT: 70 IN | RESPIRATION RATE: 16 BRPM | WEIGHT: 219.8 LBS | BODY MASS INDEX: 31.47 KG/M2 | DIASTOLIC BLOOD PRESSURE: 70 MMHG | HEART RATE: 86 BPM | OXYGEN SATURATION: 99 % | SYSTOLIC BLOOD PRESSURE: 112 MMHG

## 2024-01-05 DIAGNOSIS — J34.89 CONCHA BULLOSA: ICD-10-CM

## 2024-01-05 DIAGNOSIS — J34.2 DEVIATED NASAL SEPTUM: ICD-10-CM

## 2024-01-05 DIAGNOSIS — J34.3 HYPERTROPHY OF BOTH INFERIOR NASAL TURBINATES: ICD-10-CM

## 2024-01-05 DIAGNOSIS — H57.11 EYE PAIN, RIGHT: ICD-10-CM

## 2024-01-05 DIAGNOSIS — R09.81 CHRONIC NASAL CONGESTION: ICD-10-CM

## 2024-01-05 DIAGNOSIS — J32.9 CHRONIC SINUSITIS, UNSPECIFIED LOCATION: ICD-10-CM

## 2024-01-05 DIAGNOSIS — G89.18 POSTOPERATIVE PAIN: Primary | ICD-10-CM

## 2024-01-05 PROBLEM — J35.2 ADENOID HYPERTROPHY: Status: RESOLVED | Noted: 2024-01-05 | Resolved: 2024-01-05

## 2024-01-05 PROBLEM — J35.2 ADENOID HYPERTROPHY: Status: ACTIVE | Noted: 2024-01-05

## 2024-01-05 PROCEDURE — 31240 NSL/SNS NDSC CNCH BULL RESCJ: CPT | Performed by: OTOLARYNGOLOGY

## 2024-01-05 PROCEDURE — 88304 TISSUE EXAM BY PATHOLOGIST: CPT | Performed by: OTOLARYNGOLOGY

## 2024-01-05 PROCEDURE — 25810000003 LACTATED RINGERS PER 1000 ML: Performed by: ANESTHESIOLOGY

## 2024-01-05 PROCEDURE — 30520 REPAIR OF NASAL SEPTUM: CPT | Performed by: OTOLARYNGOLOGY

## 2024-01-05 PROCEDURE — 42831 REMOVAL OF ADENOIDS: CPT | Performed by: OTOLARYNGOLOGY

## 2024-01-05 PROCEDURE — 25010000002 DEXAMETHASONE PER 1 MG

## 2024-01-05 PROCEDURE — 30140 RESECT INFERIOR TURBINATE: CPT | Performed by: OTOLARYNGOLOGY

## 2024-01-05 PROCEDURE — 88311 DECALCIFY TISSUE: CPT | Performed by: OTOLARYNGOLOGY

## 2024-01-05 PROCEDURE — 25010000002 SUGAMMADEX 200 MG/2ML SOLUTION

## 2024-01-05 PROCEDURE — 31254 NSL/SINS NDSC W/PRTL ETHMDCT: CPT | Performed by: OTOLARYNGOLOGY

## 2024-01-05 PROCEDURE — C9046 COCAINE HCL NASAL SOLUTION: HCPCS | Performed by: OTOLARYNGOLOGY

## 2024-01-05 PROCEDURE — 25010000002 HYDROMORPHONE 1 MG/ML SOLUTION

## 2024-01-05 PROCEDURE — 25010000002 ONDANSETRON PER 1 MG

## 2024-01-05 PROCEDURE — 31256 EXPLORATION MAXILLARY SINUS: CPT | Performed by: OTOLARYNGOLOGY

## 2024-01-05 PROCEDURE — 25010000002 COCAINE HCL 40 MG/ML SOLUTION: Performed by: OTOLARYNGOLOGY

## 2024-01-05 PROCEDURE — 25010000002 MIDAZOLAM PER 1MG: Performed by: ANESTHESIOLOGY

## 2024-01-05 PROCEDURE — 0 HYDROMORPHONE 1 MG/ML SOLUTION

## 2024-01-05 PROCEDURE — 25010000002 PROPOFOL 10 MG/ML EMULSION

## 2024-01-05 RX ORDER — PROPOFOL 10 MG/ML
VIAL (ML) INTRAVENOUS AS NEEDED
Status: DISCONTINUED | OUTPATIENT
Start: 2024-01-05 | End: 2024-01-05 | Stop reason: SURG

## 2024-01-05 RX ORDER — PROMETHAZINE HYDROCHLORIDE 12.5 MG/1
25 TABLET ORAL ONCE AS NEEDED
Status: DISCONTINUED | OUTPATIENT
Start: 2024-01-05 | End: 2024-01-05 | Stop reason: HOSPADM

## 2024-01-05 RX ORDER — ACETAMINOPHEN 500 MG
1000 TABLET ORAL ONCE
Status: COMPLETED | OUTPATIENT
Start: 2024-01-05 | End: 2024-01-05

## 2024-01-05 RX ORDER — OXYMETAZOLINE HYDROCHLORIDE 0.05 G/100ML
2 SPRAY NASAL 2 TIMES DAILY
Status: DISCONTINUED | OUTPATIENT
Start: 2024-01-05 | End: 2024-01-05 | Stop reason: HOSPADM

## 2024-01-05 RX ORDER — OXYCODONE HYDROCHLORIDE 5 MG/1
5 TABLET ORAL
Status: DISCONTINUED | OUTPATIENT
Start: 2024-01-05 | End: 2024-01-05 | Stop reason: HOSPADM

## 2024-01-05 RX ORDER — ROCURONIUM BROMIDE 10 MG/ML
INJECTION, SOLUTION INTRAVENOUS AS NEEDED
Status: DISCONTINUED | OUTPATIENT
Start: 2024-01-05 | End: 2024-01-05 | Stop reason: SURG

## 2024-01-05 RX ORDER — LIDOCAINE HYDROCHLORIDE AND EPINEPHRINE 10; 10 MG/ML; UG/ML
INJECTION, SOLUTION INFILTRATION; PERINEURAL AS NEEDED
Status: DISCONTINUED | OUTPATIENT
Start: 2024-01-05 | End: 2024-01-05 | Stop reason: HOSPADM

## 2024-01-05 RX ORDER — ONDANSETRON 2 MG/ML
4 INJECTION INTRAMUSCULAR; INTRAVENOUS ONCE AS NEEDED
Status: DISCONTINUED | OUTPATIENT
Start: 2024-01-05 | End: 2024-01-05 | Stop reason: HOSPADM

## 2024-01-05 RX ORDER — DIPHENHYDRAMINE HYDROCHLORIDE 50 MG/ML
12.5 INJECTION INTRAMUSCULAR; INTRAVENOUS ONCE AS NEEDED
Status: DISCONTINUED | OUTPATIENT
Start: 2024-01-05 | End: 2024-01-05 | Stop reason: HOSPADM

## 2024-01-05 RX ORDER — PHENYLEPHRINE HCL IN 0.9% NACL 1 MG/10 ML
SYRINGE (ML) INTRAVENOUS AS NEEDED
Status: DISCONTINUED | OUTPATIENT
Start: 2024-01-05 | End: 2024-01-05 | Stop reason: SURG

## 2024-01-05 RX ORDER — AZITHROMYCIN 250 MG/1
TABLET, FILM COATED ORAL
Qty: 6 TABLET | Refills: 0 | Status: SHIPPED | OUTPATIENT
Start: 2024-01-05

## 2024-01-05 RX ORDER — DEXAMETHASONE SODIUM PHOSPHATE 4 MG/ML
INJECTION, SOLUTION INTRA-ARTICULAR; INTRALESIONAL; INTRAMUSCULAR; INTRAVENOUS; SOFT TISSUE AS NEEDED
Status: DISCONTINUED | OUTPATIENT
Start: 2024-01-05 | End: 2024-01-05 | Stop reason: SURG

## 2024-01-05 RX ORDER — PROMETHAZINE HYDROCHLORIDE 25 MG/1
25 SUPPOSITORY RECTAL ONCE AS NEEDED
Status: DISCONTINUED | OUTPATIENT
Start: 2024-01-05 | End: 2024-01-05 | Stop reason: HOSPADM

## 2024-01-05 RX ORDER — LIDOCAINE HYDROCHLORIDE 20 MG/ML
INJECTION, SOLUTION EPIDURAL; INFILTRATION; INTRACAUDAL; PERINEURAL AS NEEDED
Status: DISCONTINUED | OUTPATIENT
Start: 2024-01-05 | End: 2024-01-05 | Stop reason: SURG

## 2024-01-05 RX ORDER — MEPERIDINE HYDROCHLORIDE 25 MG/ML
12.5 INJECTION INTRAMUSCULAR; INTRAVENOUS; SUBCUTANEOUS
Status: DISCONTINUED | OUTPATIENT
Start: 2024-01-05 | End: 2024-01-05 | Stop reason: HOSPADM

## 2024-01-05 RX ORDER — EPHEDRINE SULFATE 50 MG/ML
INJECTION, SOLUTION INTRAVENOUS AS NEEDED
Status: DISCONTINUED | OUTPATIENT
Start: 2024-01-05 | End: 2024-01-05 | Stop reason: SURG

## 2024-01-05 RX ORDER — MIDAZOLAM HYDROCHLORIDE 2 MG/2ML
2 INJECTION, SOLUTION INTRAMUSCULAR; INTRAVENOUS ONCE
Status: COMPLETED | OUTPATIENT
Start: 2024-01-05 | End: 2024-01-05

## 2024-01-05 RX ORDER — SODIUM CHLORIDE, SODIUM LACTATE, POTASSIUM CHLORIDE, CALCIUM CHLORIDE 600; 310; 30; 20 MG/100ML; MG/100ML; MG/100ML; MG/100ML
9 INJECTION, SOLUTION INTRAVENOUS CONTINUOUS PRN
Status: DISCONTINUED | OUTPATIENT
Start: 2024-01-05 | End: 2024-01-05 | Stop reason: HOSPADM

## 2024-01-05 RX ORDER — MAGNESIUM HYDROXIDE 1200 MG/15ML
LIQUID ORAL AS NEEDED
Status: DISCONTINUED | OUTPATIENT
Start: 2024-01-05 | End: 2024-01-05 | Stop reason: HOSPADM

## 2024-01-05 RX ORDER — HYDROCODONE BITARTRATE AND ACETAMINOPHEN 7.5; 325 MG/1; MG/1
1 TABLET ORAL EVERY 6 HOURS PRN
Qty: 30 TABLET | Refills: 0 | Status: SHIPPED | OUTPATIENT
Start: 2024-01-05

## 2024-01-05 RX ORDER — DEXMEDETOMIDINE HYDROCHLORIDE 100 UG/ML
INJECTION, SOLUTION INTRAVENOUS AS NEEDED
Status: DISCONTINUED | OUTPATIENT
Start: 2024-01-05 | End: 2024-01-05 | Stop reason: SURG

## 2024-01-05 RX ORDER — ONDANSETRON 2 MG/ML
INJECTION INTRAMUSCULAR; INTRAVENOUS AS NEEDED
Status: DISCONTINUED | OUTPATIENT
Start: 2024-01-05 | End: 2024-01-05 | Stop reason: SURG

## 2024-01-05 RX ORDER — COCAINE HYDROCHLORIDE 40 MG/ML
SOLUTION NASAL AS NEEDED
Status: DISCONTINUED | OUTPATIENT
Start: 2024-01-05 | End: 2024-01-05 | Stop reason: HOSPADM

## 2024-01-05 RX ADMIN — DEXMEDETOMIDINE HYDROCHLORIDE 12 MCG: 100 INJECTION, SOLUTION, CONCENTRATE INTRAVENOUS at 07:43

## 2024-01-05 RX ADMIN — ROCURONIUM BROMIDE 50 MG: 10 INJECTION, SOLUTION INTRAVENOUS at 07:45

## 2024-01-05 RX ADMIN — PROPOFOL 170 MG: 10 INJECTION, EMULSION INTRAVENOUS at 07:45

## 2024-01-05 RX ADMIN — ROCURONIUM BROMIDE 10 MG: 10 INJECTION, SOLUTION INTRAVENOUS at 09:30

## 2024-01-05 RX ADMIN — SUGAMMADEX 200 MG: 100 INJECTION, SOLUTION INTRAVENOUS at 10:29

## 2024-01-05 RX ADMIN — DEXMEDETOMIDINE HYDROCHLORIDE 4 MCG: 100 INJECTION, SOLUTION, CONCENTRATE INTRAVENOUS at 09:38

## 2024-01-05 RX ADMIN — Medication 100 MCG: at 07:59

## 2024-01-05 RX ADMIN — HYDROMORPHONE HYDROCHLORIDE 0.5 MG: 1 INJECTION, SOLUTION INTRAMUSCULAR; INTRAVENOUS; SUBCUTANEOUS at 11:05

## 2024-01-05 RX ADMIN — Medication 200 MCG: at 08:18

## 2024-01-05 RX ADMIN — Medication 100 MCG: at 07:53

## 2024-01-05 RX ADMIN — LIDOCAINE HYDROCHLORIDE 100 MG: 20 INJECTION, SOLUTION EPIDURAL; INFILTRATION; INTRACAUDAL; PERINEURAL at 07:45

## 2024-01-05 RX ADMIN — Medication 200 MCG: at 08:23

## 2024-01-05 RX ADMIN — DEXMEDETOMIDINE HYDROCHLORIDE 12 MCG: 100 INJECTION, SOLUTION, CONCENTRATE INTRAVENOUS at 09:26

## 2024-01-05 RX ADMIN — SODIUM CHLORIDE, POTASSIUM CHLORIDE, SODIUM LACTATE AND CALCIUM CHLORIDE: 600; 310; 30; 20 INJECTION, SOLUTION INTRAVENOUS at 08:31

## 2024-01-05 RX ADMIN — ACETAMINOPHEN 1000 MG: 500 TABLET ORAL at 07:18

## 2024-01-05 RX ADMIN — HYDROMORPHONE HYDROCHLORIDE 0.5 MG: 1 INJECTION, SOLUTION INTRAMUSCULAR; INTRAVENOUS; SUBCUTANEOUS at 07:45

## 2024-01-05 RX ADMIN — EPHEDRINE SULFATE 25 MG: 50 INJECTION INTRAVENOUS at 08:26

## 2024-01-05 RX ADMIN — NASAL DECONGESTANT 2 SPRAY: 0.05 SPRAY NASAL at 07:32

## 2024-01-05 RX ADMIN — HYDROMORPHONE HYDROCHLORIDE 0.25 MG: 1 INJECTION, SOLUTION INTRAMUSCULAR; INTRAVENOUS; SUBCUTANEOUS at 09:30

## 2024-01-05 RX ADMIN — DEXAMETHASONE SODIUM PHOSPHATE 8 MG: 4 INJECTION, SOLUTION INTRAMUSCULAR; INTRAVENOUS at 07:55

## 2024-01-05 RX ADMIN — MIDAZOLAM HYDROCHLORIDE 2 MG: 1 INJECTION, SOLUTION INTRAMUSCULAR; INTRAVENOUS at 07:32

## 2024-01-05 RX ADMIN — DEXMEDETOMIDINE HYDROCHLORIDE 12 MCG: 100 INJECTION, SOLUTION, CONCENTRATE INTRAVENOUS at 10:01

## 2024-01-05 RX ADMIN — SODIUM CHLORIDE, POTASSIUM CHLORIDE, SODIUM LACTATE AND CALCIUM CHLORIDE 9 ML/HR: 600; 310; 30; 20 INJECTION, SOLUTION INTRAVENOUS at 07:18

## 2024-01-05 RX ADMIN — HYDROMORPHONE HYDROCHLORIDE 0.25 MG: 1 INJECTION, SOLUTION INTRAMUSCULAR; INTRAVENOUS; SUBCUTANEOUS at 10:01

## 2024-01-05 RX ADMIN — ONDANSETRON 4 MG: 2 INJECTION INTRAMUSCULAR; INTRAVENOUS at 10:28

## 2024-01-05 RX ADMIN — ROCURONIUM BROMIDE 40 MG: 10 INJECTION, SOLUTION INTRAVENOUS at 08:31

## 2024-01-05 RX ADMIN — Medication 100 MCG: at 09:18

## 2024-01-05 NOTE — DISCHARGE INSTRUCTIONS
DISCHARGE INSTRUCTIONS NASAL/SINUS SURGERY      For your surgery you had:  General anesthesia (you may have a sore throat for the first 24 hours)  You may experience dizziness, drowsiness, or lightheadedness for several hours following surgery.  Do not stay alone today or tonight.  Limit your activity for 24 hours.  You should not drive, operate machinery, drink alcohol,or sign legally binding documents for 24 hours or while you are taking pain medication.  Resume your diet slowly.  Follow any special dietary instructions you may have been given by your doctor.      NOTIFY YOUR DOCTOR IF YOU EXPERIENCE ANY OF THE FOLLOWING:  Temperature greater than 101 degrees Fahrenheit  Shaking Chills  Redness or excessive drainage from incision  Nausea, vomiting and/or pain that is not controlled by prescribed medications  Increase in bleeding or bleeding that is excessive  Unable to urinate in 6 hours after surgery  If unable to reach your doctor, please go to the closest Emergency Room. Change gauze pad under nose as necessary.  Notify the surgeon of excessive bleeding.  Ice packs for at least 24 hours to lessen swelling and bruising if desired.  Rest/sleep with head elevated on 2 to 3 pillows.                           Mouth care is acceptable for relief of dry mouth.  You may also use a cool mist humidifier in the room.  Your doctor will tell you when packing/splint will be removed.  Do not blow your nose.  If you must sneeze, do so with your mouth open.                 Avoid heavy lifting or straining, limit bending.      SPECIAL INSTRUCTIONS:        Last dose of pain medication was given at:  Tylenol (1000mg) last at 7:18am. Do not exceed 4000mg of tylenol in a 24 hour period.

## 2024-01-05 NOTE — ANESTHESIA PREPROCEDURE EVALUATION
Anesthesia Evaluation     Patient summary reviewed and Nursing notes reviewed   no history of anesthetic complications:   NPO Solid Status: > 8 hours  NPO Liquid Status: > 8 hours           Airway   Mallampati: II  TM distance: >3 FB  Neck ROM: full  No difficulty expected  Dental      Pulmonary - negative pulmonary ROS and normal exam    breath sounds clear to auscultation  Cardiovascular - negative cardio ROS and normal exam  Exercise tolerance: good (4-7 METS)    Rhythm: regular  Rate: normal        Neuro/Psych  (+) psychiatric history Depression  GI/Hepatic/Renal/Endo - negative ROS     Musculoskeletal (-) negative ROS    Abdominal    Substance History - negative use     OB/GYN negative ob/gyn ROS         Other - negative ROS       ROS/Med Hx Other: PAT Nursing Notes unavailable.                 Anesthesia Plan    ASA 1     general     (Patient understands anesthesia not responsible for dental damage.)  intravenous induction     Anesthetic plan, risks, benefits, and alternatives have been provided, discussed and informed consent has been obtained with: patient.    Use of blood products discussed with patient .    Plan discussed with CRNA.      CODE STATUS:

## 2024-01-05 NOTE — NURSING NOTE
[id number] pt mother john on speaker phone and she talked with dr nguyễn to get phone cosent for adenoidectomy witnessed also by room staff and carrie easley-rn

## 2024-01-05 NOTE — H&P
PRIMARY CARE PROVIDER: Lisset López APRN  REFERRING PROVIDER: ELIAZAR Barrera    CHIEF COMPLAINT:  Preoperative evaluation for surgery    Subjective   History of Present Illness:  Mario King is a  28 y.o.  male who is here for the following problems:    Hypertrophy of both inferior nasal turbinates    Chronic sinusitis    Eye pain, right    Chronic nasal congestion    Deviated nasal septum    Hipolito bullosa      He is scheduled for ENDOSCOPIC  SINUS SURGERY WITH HIPOLITO BULLECTOMY, SEPTOPLASTY, BILATERAL INFERIOR TURBINATE SUBMUCOUS RESECTION WITH CAUTERIZATION, NASAL VALVE REPAIR WITH LATERA NASAL IMPLANTS (Bilateral). There has been no significant change in the history since the preoperative office evaluation.     Review of Systems:  CONSTITUTIONAL: no fever or chills  PULMONARY: no cough or shortness of breath  GI: no nausea or vomiting    Past History:  Medical History: has a past medical history of ADHD and Chronic nasal congestion.   Surgical History: has a past surgical history that includes Hernia repair.   Family History: family history is not on file.   Social History: reports that he has never smoked. He has been exposed to tobacco smoke. He has never used smokeless tobacco. He reports current alcohol use. He reports that he does not use drugs.  Home Medications:  amphetamine-dextroamphetamine, citalopram, ibuprofen, and prazosin     Allergies: Ceclor [cefaclor]   3}  History     Last Reviewed by Domonique Tanner RN on 1/5/2024 at  6:52 AM    Sections Reviewed    Medical, Surgical, Family, Tobacco, Custom, Alcohol, Drug Use, Sexual   Activity            Objective     Vital Signs:  Temp:  [97.8 °F (36.6 °C)] 97.8 °F (36.6 °C)  Heart Rate:  [90] 90  Resp:  [18] 18  BP: (105)/(77) 105/77    Physical Exam:  CONSTITUTIONAL: well nourished, well-developed, alert, oriented, in no acute distress   COMMUNICATION AND VOICE: able to communicate normally, normal voice quality  HEAD: normocephalic, no lesions,  atraumatic, no tenderness, no masses   FACE: appearance normal, no lesions, no tenderness, no deformities, facial motion symmetric  EYES: ocular motility normal, eyelids normal, orbits normal, no proptosis, conjunctiva normal , pupils equal, round   EARS:  Hearing: hearing to conversational voice intact bilaterally   External Ears: normal bilaterally, no lesions  NOSE:  External Nose: external nasal structure normal, no tenderness on palpation, no nasal discharge, no lesions, no evidence of trauma, nostrils patent   ORAL:  Lips: upper and lower lips without lesion   NECK:  Inspection and Palpation: neck appearance normal, no masses or tenderness  CHEST/RESPIRATORY: normal respiratory effort   CARDIOVASCULAR: no cyanosis or edema   NEUROLOGICAL/PSYCHIATRIC: oriented to time, place and person, mood normal, affect appropriate, CN II-XII intact grossly      ASSESSMENT:    Hypertrophy of both inferior nasal turbinates    Chronic sinusitis    Eye pain, right    Chronic nasal congestion    Deviated nasal septum    Hipolito bullosa      PLAN:  ENDOSCOPIC  SINUS SURGERY WITH HIPOLITO BULLECTOMY, SEPTOPLASTY, BILATERAL INFERIOR TURBINATE SUBMUCOUS RESECTION WITH CAUTERIZATION, NASAL VALVE REPAIR WITH LATERA NASAL IMPLANTS (Bilateral)    I have had a peer to peer discussion about nasal valve repair with Latera nasal implants which was not being covered by [employer].  Unfortunately it is in their policy not to cover this particular implant.  Therefore resubmitting or trying to repair the valve on this particular time will only leave the patient with the bill.  I have discussed this extensively with the patient.  He agrees and wants me to proceed with the rest of the surgery which has been approved by the insurance with exception of nasal valve repair using Latera nasal implants.    SEPTOPLASTY AND TURBINOPLASTY: A septoplasty and inferior turbinoplasty were recommended. The risks and benefits were explained including but  not limited to pain, bleeding, infection, risks of the general anesthesia, continued septal deviation, crusting, congestion and septal perforation. Possibilities of continued preoperative symptoms and the possible need for revision surgery and or medical therapy were discussed. Alternatives were discussed. No guarantees were made or implied. Questions were asked appropriately answered.     FUNCTIONAL ENDOSCOPIC SINUS SURGERY: A functional endoscopic sinus surgery was recommended. The risks and benefits were explained including but not limited to pain, bleeding (with the possible need for nasal packing), infection, risks of the general anesthesia, orbital injury with blurred vision or visual loss, cerebrospinal fluid leak, persistent disease, scarring, synichiae and the possibility for the need of reoperation. Possibilities of additional sinus work or less sinus work depending on the status of the nose at the time of the operation was discussed. Alternatives were discussed. No guarantees were made or implied. Questions were asked appropriately answered.       Ortiz Wilcox MD  01/05/24  07:06 EST           to get better

## 2024-01-05 NOTE — OP NOTE
ENDOSCOPIC FUNCTIONAL SINUS SURGERY  Procedure Report    Patient Name:  Mario King  YOB: 1995    Date of Surgery:  1/5/2024     Indications:    Mario King is a 28-year-old male with complaints of chronic nasal congestion, deviated nasal septum, hipolito bullosa, right eye pain, also noted to have bilateral inferior turbinate hypertrophy and chronic sinusitis symptoms.  Patient was recommended ENDOSCOPIC  SINUS SURGERY WITH HIPOLITO BULLECTOMY, SEPTOPLASTY, BILATERAL INFERIOR TURBINATE SUBMUCOUS RESECTION WITH CAUTERIZATION, and later added adenoidectomy incidentally found extensively large adenoid pad obstructing the nasopharynx and posterior choana seen with rigid nasal endoscope.  After understanding the risks, benefits and alternatives, a consent for the operation was given.     Pre-op Diagnosis:   Chronic nasal congestion [R09.81]  Deviated nasal septum [J34.2]  Hipolito bullosa [J34.89]  Chronic sinusitis, unspecified location [J32.9]  Eye pain, right [H57.11]  Hypertrophy of both inferior nasal turbinates [J34.3]    Post-Op Diagnosis Codes:     * Chronic nasal congestion [R09.81]     * Deviated nasal septum [J34.2]     * Hipolito bullosa [J34.89]     * Chronic sinusitis, unspecified location [J32.9]     * Eye pain, right [H57.11]     * Hypertrophy of both inferior nasal turbinates [J34.3]      * Adenoid hypertrophy [985274]     Procedure/CPT® Codes:    1.  SEPTOPLASTY  2.  BILATERAL INFERIOR TURBINATE SUBMUCOUS RESECTION WITH CAUTERIZATION AND OUT FRACTURE  3.  ENDOSCOPIC  SINUS SURGERY    A.  RIGHT MIDDLE HIPOLITO BULLECTOMY   B.  LEFT ANTERIOR PARTIAL ETHMOIDECTOMIES   C.  LEFT MAXILLARY ANTROSTOMY  4.  ADENOIDECTOMY     Surgeon:  Ortiz Wlicox MD    Staff:  Circulator: Eaw العلي RN  Scrub Person: Wily Cruz  Other: Nicanor Chandler RN     Anesthesia: General    Estimated Blood Loss: 50 mL    Implants:    Nothing was implanted during the procedure    Specimen:          Specimens        ID Source Type Tests Collected By Collected At Frozen?    A Nose Tissue TISSUE PATHOLOGY EXAM   Ortiz Wilcox MD 1/5/24 1033 No    Description: septal bone,cartilage,bilateral sinus contents,turbinates    C Adenoids Tissue TISSUE PATHOLOGY EXAM   Ortiz Wilcox MD 1/5/24 1012 No    Description: adenoids          Findings:   1.  Severely deviated nasal septum caudally to left and posteriorly to right.  2.  Bilateral maxillary and vomer crest spurs removed with osteotome  3.  Septal cartilage replaced back on anterior nasal spine periosteum with 3-0 prolene suture for fixation.  4.  Septal splints placed with 3-0 prolene suture after columella reconstructed to maintain strength and stability with symmetry.  5.  Bilateral large inferior turbinates partially removed via submucous resection with cauterization and out fracture.  6. Bilateral  large hi bullosa laterally resected and shaved then posteriorly released to prevent collapse.  7.  Navigation system with image guidance registered and verified then utilized throughout the case  8.  Extensive polypoid mucosal diseases in both middle meatus and ethmoid sinuses removed with shaver preserving skull base and lamina papyracea.   9.  Bilateral frontal recess and nasofrontal duct polypoid mucosa removed and its patency confirmed with light illumination.  10.  Both maxillary sinuses with natural ostia obstructed and opened with shaver and antrostomies widened communicating with the natural ostia.  Both maxillary sinus mucosa thickened and polypoid mucosal disease removed around the ostia.  11.  Both sphenoid sinus ostia also obstructed with polypoid disease and removed from the face of sphenoid prior to opening the ostia with assistance of navigation.  Anterior wall of the sinus removed on both sides to maximize the ostial opening.  Sinus mucosal diseases removed with shaver and hemostasis obtained.  12.  Bhargavi powder 3 gms topically applied  13.  Nasopore  dissolvable packing impregnated with bacitracin placed in each ethmoid cavities.  14.  Telfa packing placed inferiorly for hemostasis and removed following extubation without bleeding.  15.  Latera nasal implants placed on each side under the periosteum for maximal leverage to repair nasal valve collapse.  16.  Drip pad applied.  17.  Postop vision grossly intact.      Complications:   none    Procedure Description:  The patient was taken to the operating room and general endotracheal  anesthesia was performed in the supine position.  After adequate anesthesia  was obtained, the table was turned.  The patient was packed with a total of 6  cottonoid pledgets soaked in 4 mL of 4% cocaine with three in each nostril.  Two puffs of Afrin was applied.  Nasal hair was trimmed.  Throat pack was  placed.  Then the septum was infiltrated with 1% Xylocaine with 1:100,000  epinephrine, using a total of 10 mL.  The patient was then prepped with  Betadine and draped in the usual sterile manner.  Nasal packing was removed  and left hemitransfixion incision was made.  Mucoperichondrial and  mucoperiosteal flaps were elevated on both sides of the septum.  Bony  cartilaginous junction was  and deviated bony septum was resected  with Isra-Nimco and cup forceps.  Vomer spur as well as maxillary crest  spurs were removed with osteotome and cartilaginous septum that was deviated  was excised and placed back in the septum with a flat piece for future use.  Otherwise, caudal septum was placed back on the top of the maxillary crest  for stabilization and tip support.  Also columella was dissected in midline  to accommodate the septum.  Cartilaginous septum was then sutured onto the  anterior nasal spine periosteum using 3-0 Prolene suture.  Then subsequently  left hemitransfixion incision was closed 4-0 chromic in interrupted fashion  to narrow the columella.  Then septal suture was placed with 4-0 plain gut in  mattress  fashion to reapproximate the septal mucosa.  Septal splints were  then cut in appropriate size and shape, placed and fastened with 3-0 chromic  suture.  With the septum nicely in midline with excellent tip support, the  inferior turbinates were infractured, clamped with Bainbridge clamps and  submucous resection was performed.  Residual inferior turbinate mucosa was  cauterized for hemostasis and then the remaining inferior turbinates were  outfractured.  This significantly improved the nasal airway on both sides.  Navigation system was registered and verified utilizing it throughout the case.  Nasal packing was then removed and endoscopic photodocumentation was done. Subsequently 1% Xylocaine with 1-100,000 epinephrine was injected along both middle turbinate and lateral wall.  Bilateral large middle hi bullosa was noted and this was split dissected laterally and shaved.  Bilateral anterior and posterior ethmoidectomies were performed preserving skull base and lamina papyracea removing mucosal disease.  Left maxillary sinus natural ostium was partially compromised with obstruction and therefore this was opened with uncinectomy and antrostomy was enlarged communicating with the natural ostium.  Within the left maxillary sinus mucosa was thickened and partially removed polypoid disease around the antrostomy.  Right maxillary sinus also showed a natural ostial partial obstruction and therefore antrostomy was performed with uncinectomy and then subsequently polypoid mucosal disease around the antrostomy was removed as well.  Antrostomy was communicated with the natural ostium and this was confirmed.  Both frontal recess diseased mucosa with polyps removed and its wide nasofrontal duct patency was confirmed with navigation and light illumination.  Both face of sphenoid sinuses show polypoid mucosa in conjunction with middle turbinate posteriorly and removed with shaver.  Sphenoid natural ostia opened with use of  navigation and anterior wall of the sinuses opened to widen the ostia.  Mucosal polypoid diseases removed also with hemostasis.  After obtaining hemostasis, ashley powder 3gms were applied topically and both ethmoid cavities were packed with dissolvable nasopore packing impregnated with bacitracin medializing the middle turbinates.  At this point nasal valves that were prominently collapsing were repaired by  taking the Latera nasal implants on each side, elevating the valve and then  subsequently placing the proximal portion of the implant under the periosteum  of the nasal bone on each side.  After this was done and hemostasis was  obtained, Telfa impregnated with bacitracin was packed  into each inferior nasal cavity for hemostasis.  Drip pad was applied in a  routine manner and throat pack was removed.  Orogastric tube was passed down  to suction out the gastric contents.  Subsequently, the patient was extubated  and following extubation nasal Telfa packing was removed uneventfully without  bleeding.  Then subsequently the patient was transported to the recovery room  in good condition.  Postoperative vision was grossly intact.      Ortiz Wilcox MD     Date: 1/5/2024  Time: 11:15 EST

## 2024-01-05 NOTE — ANESTHESIA POSTPROCEDURE EVALUATION
Patient: Mario King    Procedure Summary       Date: 01/05/24 Room / Location: [facility] OR 02 / [facility] MAIN OR    Anesthesia Start: 0740 Anesthesia Stop: 1053    Procedure: ENDOSCOPIC  SINUS SURGERY WITH HIPOLITO BULLECTOMY, SEPTOPLASTY, BILATERAL INFERIOR TURBINATE SUBMUCOUS RESECTION WITH CAUTERIZATION,adenoidectomy (Bilateral: Nose) Diagnosis:       Chronic nasal congestion      Deviated nasal septum      Hipolito bullosa      Chronic sinusitis, unspecified location      Eye pain, right      Hypertrophy of both inferior nasal turbinates      Adenoid hypertrophy      (Chronic nasal congestion [R09.81])      (Deviated nasal septum [J34.2])      (Hipolito bullosa [J34.89])      (Chronic sinusitis, unspecified location [J32.9])      (Eye pain, right [H57.11])      (Hypertrophy of both inferior nasal turbinates [J34.3])    Surgeons: Ortiz Wilcox MD Provider: Jonah Jules MD    Anesthesia Type: general ASA Status: 1            Anesthesia Type: general    Vitals  Vitals Value Taken Time   /80 01/05/24 1130   Temp 36.3 °C (97.3 °F) 01/05/24 1130   Pulse 79 01/05/24 1130   Resp 16 01/05/24 1130   SpO2 98 % 01/05/24 1130           Post Anesthesia Care and Evaluation    Patient location during evaluation: bedside  Patient participation: complete - patient participated  Level of consciousness: awake  Pain management: adequate    Airway patency: patent  PONV Status: none  Cardiovascular status: acceptable  Respiratory status: acceptable  Hydration status: acceptable    Comments: An Anesthesiologist personally participated in the most demanding procedures (including induction and emergence if applicable) in the anesthesia plan, monitored the course of anesthesia administration at frequent intervals and remained physically present and available for immediate diagnosis and treatment of emergencies.

## 2024-01-10 LAB
CYTO UR: NORMAL
LAB AP CASE REPORT: NORMAL
LAB AP CLINICAL INFORMATION: NORMAL
PATH REPORT.FINAL DX SPEC: NORMAL
PATH REPORT.GROSS SPEC: NORMAL

## 2024-01-11 ENCOUNTER — OFFICE VISIT (OUTPATIENT)
Dept: OTOLARYNGOLOGY | Facility: CLINIC | Age: 29
End: 2024-01-11
Payer: COMMERCIAL

## 2024-01-11 VITALS — HEIGHT: 70 IN | BODY MASS INDEX: 30.01 KG/M2 | TEMPERATURE: 97.3 F | WEIGHT: 209.6 LBS

## 2024-01-11 DIAGNOSIS — J32.9 CHRONIC SINUSITIS, UNSPECIFIED LOCATION: Primary | ICD-10-CM

## 2024-01-11 DIAGNOSIS — J34.2 DEVIATED NASAL SEPTUM: ICD-10-CM

## 2024-01-11 DIAGNOSIS — J34.89 CONCHA BULLOSA: ICD-10-CM

## 2024-01-11 DIAGNOSIS — R09.81 CHRONIC NASAL CONGESTION: ICD-10-CM

## 2024-01-11 DIAGNOSIS — J30.89 ALLERGIC RHINITIS DUE TO OTHER ALLERGIC TRIGGER, UNSPECIFIED SEASONALITY: ICD-10-CM

## 2024-01-11 DIAGNOSIS — H57.11 EYE PAIN, RIGHT: ICD-10-CM

## 2024-01-11 PROCEDURE — 99024 POSTOP FOLLOW-UP VISIT: CPT | Performed by: OTOLARYNGOLOGY

## 2024-01-11 PROCEDURE — 31231 NASAL ENDOSCOPY DX: CPT | Performed by: OTOLARYNGOLOGY

## 2024-01-11 NOTE — PROGRESS NOTES
Patient Name: Mario King   Visit Date: 01/11/2024   Patient ID: [id number]  Provider: Ortiz Wilcox MD    Sex: male  Location: [facility]   YOB: 1995  Location Address: [address] [place] [address], [address] [place] [address],?[place]?[id number]    Primary Care Provider Lisset López APRN  Location Phone: [phone]    Referring Provider: No ref. provider found        Chief Complaint  1 WEEK POST OP    History of Present Illness  Mario King is a 28 y.o. male who presents to [facility] for 1 WEEK POST OP.  Patient had complaints of chronic nasal congestion, deviated nasal septum, hipolito bullosa, right eye pain and also bilateral inferior turbinate hypertrophy as well as chronic sinusitis symptoms.  He underwent septoplasty, bilateral inferior turbinate submucous resection with cauterization, endoscopic sinus surgery with right middle hipolito bullectomy, left anterior partial ethmoidectomies, left maxillary antrostomy and of course adenoidectomy that was incidentally noted extensive amount of adenoid tissue present at the back of the nose.  All this was done on January 5, 2024.  Patient is here for first postop 1 week appointment.  Reports having a lot of crusting present.    Past Medical History:   Diagnosis Date    ADHD     Chronic nasal congestion        Past Surgical History:   Procedure Laterality Date    ENDOSCOPIC FUNCTIONAL SINUS SURGERY (FESS) Bilateral 1/5/2024    Procedure: ENDOSCOPIC  SINUS SURGERY WITH HIPOLITO BULLECTOMY, SEPTOPLASTY, BILATERAL INFERIOR TURBINATE SUBMUCOUS RESECTION WITH CAUTERIZATION,adenoidectomy;  Surgeon: Ortiz Wilcox MD;  Location: [facility] MAIN OR;  Service: ENT;  Laterality: Bilateral;    HERNIA REPAIR           Current Outpatient Medications:     amphetamine-dextroamphetamine (ADDERALL) 20 MG tablet, Take 1 tablet by mouth 2 (Two) Times a Day., Disp: , Rfl:     citalopram (CeleXA) 20 MG tablet, Take 1 tablet by mouth Daily.,  "Disp: , Rfl:     HYDROcodone-acetaminophen (NORCO) 7.5-325 MG per tablet, Take 1 tablet by mouth Every 6 (Six) Hours As Needed for Moderate Pain (Pain)., Disp: 30 tablet, Rfl: 0    prazosin (MINIPRESS) 2 MG capsule, Take 1 capsule by mouth every night at bedtime., Disp: , Rfl:     sodium chloride (OCEAN) 0.65 % nasal spray, 2-3 sprays into the nostril(s) as directed by provider As Needed (nasal irrigation)., Disp: 50 mL, Rfl: 12    azithromycin (ZITHROMAX) 250 MG tablet, Take 2 tablets the first day, then 1 tablet daily for 4 days., Disp: 6 tablet, Rfl: 0     Allergies   Allergen Reactions    Ceclor [Cefaclor] Unknown - Low Severity     Not sure what his reaction is, had as a kid.       Social History     Tobacco Use    Smoking status: Never     Passive exposure: Past    Smokeless tobacco: Never   Vaping Use    Vaping Use: Never used   Substance Use Topics    Alcohol use: Yes     Comment: on occ    Drug use: Never        Objective     Vital Signs:   Vitals:    01/11/24 1129   Temp: 97.3 °F (36.3 °C)   TempSrc: Temporal   Weight: 95.1 kg (209 lb 9.6 oz)   Height: 177.8 cm (70\")       Tobacco Use: Low Risk  (1/11/2024)    Patient History     Smoking Tobacco Use: Never     Smokeless Tobacco Use: Never     Passive Exposure: Past         Physical Exam    Constitutional   Appearance  well developed, well-nourished, alert and in no acute distress, voice clear and strong    Head   Inspection  no deformities or lesions      Face   Inspection  no facial lesions; House-Brackmann I/VI bilaterally   Palpation  no TMJ crepitus nor  muscle tenderness bilaterally     Eyes/Vision   Visual Fields  extraocular movements are intact, no spontaneous or gaze-induced nystagmus  Conjunctivae  clear   Sclerae  clear   Pupils and Irises  pupils equal, round, and reactive to light.   Nystagmus  not present     Ears, Nose, Mouth and Throat  Ears  External Ears  appearance within normal limits, no lesions present   Otoscopic " Examination  tympanic membrane appearance within normal limits bilaterally without perforations, well-aerated middle ears   Hearing  intact to conversational voice both ears   Tunning fork testing    Rinne:  Barney:    Nose  External Nose  appearance normal   Intranasal Exam  mucosa within normal limits, vestibules normal, no intranasal lesions present, septum midline, sinuses non tender to percussion   Modified [place] Test:    Oral Cavity  Oral Mucosa  oral mucosa normal without pallor or cyanosis   Lips  lip appearance normal   Teeth  normal dentition for age   Gums  gums pink, non-swollen, no bleeding present   Tongue  tongue appearance normal; normal mobility   Palate  hard palate normal, soft palate appearance normal with symmetric mobility     Throat  Oropharynx  no inflammation or lesions present, tonsils within normal limits   Hypopharynx  appearance within normal limits   Larynx  voice normal     Neck  Inspection/Palpation  normal appearance, no masses or tenderness, trachea midline; thyroid size normal, nontender, no nodules or masses present on palpation     Lymphatic  Neck  no lymphadenopathy present   Supraclavicular Nodes  no lymphadenopathy present   Preauricular Nodes  no lymphadenopathy present     Respiratory  Respiratory Effort  breathing unlabored   Inspection of Chest  normal appearance, no retractions     Musculoskeletal   Cervical back: Normal range of motion and neck supple.      Skin and Subcutaneous Tissue  General Inspection  Regarding face and neck - there are no rashes present, no lesions present, and no areas of discoloration     Neurologic  Cranial Nerves  cranial nerves II-XII are grossly intact bilaterally   Gait and Station  normal gait, able to stand without diffculty    Psychiatric  Judgement and Insight  judgment and insight intact   Mood and Affect  mood normal, affect appropriate       RESULTS REVIEWED    I have reviewed the following information:   [x]  Previous Internal  "Note  []  Previous External Note:   [x]  Ordered Tests & Results:      Pathology:   Lab Results   Component Value Date    CASEREPORT  01/05/2024     Surgical Pathology Report                         Case: [id number]                                  Authorizing Provider:  Ortiz Wilcox MD         Collected:           01/05/2024 10:12 AM          Ordering Location:     [facility] Received:            01/08/2024 07:04 AM                                 OR                                                                           Pathologist:           Dawood Silver MD                                                            Specimens:   1) - Nose, septal bone,cartilage,bilateral sinus contents,turbinates                                2) - Adenoids, adenoids                                                                    CLININFO  01/05/2024     Chronic nasal congestion  Deviated nasal septum  Tita bullosa  Chronic sinusitis, unspecified location  Eye pain, right  Hypertrophy of both inferior nasal turbinates      FINALDX  01/05/2024     1. Septal bone, cartilage, bilateral sinus contents, and turbinates, removal:   - Unremarkable fragments of cartilage and bone   - Chronic rhinosinusitis      2. Adenoids, adenoidectomy:   - Reactive lymphoid hyperplasia    - Acute inflammation       GROSSDES  01/05/2024     1. Nose.  Received in formalin and labeled \"septal bone, cartilage, bilateral sinus contents, turbinates\" is a 4.5 cm aggregate of tan mucosa, cartilage, bone, and turbinate fragments.  Representative sections are submitted in 1 cassette following decalcification.    2. Adenoids.  Received in formalin and labeled \"adenoids\" is a 5 g, 3.0 x 3.0 x 1.0 cm aggregate of cauterized adenoid fragments.  Sectioning reveals a tan, soft, convoluted cut surface.  Representative sections are submitted in 1 cassette.   HAO      MICRO  01/05/2024     Microscopic examination performed.         No results " "found for: \"TSH\", \"T3FREE\", \"FREET4\", \"PTH\", \"THYROGLB\", \"CALCIUM\", \"WOLA95XL\", \"THYRSTIMIMMU\", \"THYROIDAB\"    No Images in the past 120 days found..    I have discussed the interpretation of the above results with the patient.    Nasal endoscopy    Date/Time: 1/11/2024 11:47 AM    Performed by: Ortiz Wilcox MD  Authorized by: Ortiz Wilcox MD    Consent:     Consent obtained:  Verbal    Consent given by:  Patient    Risks discussed:  Bleeding and pain    Alternatives discussed:  No treatment and delayed treatment  Procedure details:     Medication:  Afrin and Vincent-Synephrine 1%    Instrument: rigid nasal endoscopy      Scope location: bilateral nare    Post-procedure details:     Patient tolerance of procedure:  Tolerated well  Comments:      Rigid nasal endoscopy was performed in both nostrils.  Extensive swelling is noted still from the superior aspect but the inferiorly along with the previous turbinectomies were done I wide open.  Therefore plastic splints that are placed were removed uneventfully.  Nasal cavity was suctioned clear with excellent airway present.  Septum is nicely midline.            Assessment and Plan   Diagnoses and all orders for this visit:    1. Chronic sinusitis, unspecified location (Primary)    2. Eye pain, right    3. Chronic nasal congestion    4. Deviated nasal septum    5. Tita bullosa    6. Allergic rhinitis due to other allergic trigger, unspecified seasonality    Other orders  -     $ Nasal / Sinus Endoscopy        Mario King  reports that he has never smoked. He has been exposed to tobacco smoke. He has never used smokeless tobacco.    Plan:  There are no Patient Instructions on file for this visit.     20 minutes were spent caring for Mario on this date of service. This time spent by me includes preparing for the visit, reviewing tests, obtaining/reviewing separately obtained history, performing medically appropriate exam/evaluation, counseling/educating the " patient/family/caregiver, ordering medications/tests/procedures, referring/communicating with other health care professionals, documenting information in the medical record, independently interpreting results and communicating that with the patient/family/caregiver and/or care coordination.       Follow Up   No follow-ups on file.  Patient was given instructions and counseling regarding his condition or for health maintenance advice. Please see specific information pulled into the AVS if appropriate.    Answers submitted by the patient for this visit:  Primary Reason for Visit (Submitted on 1/11/2024)  What is the primary reason for your visit?: Other  Other (Submitted on 1/11/2024)  Please describe your symptoms.: Surgery follow up  Have you had these symptoms before?: No  How long have you been having these symptoms?: 5-7 days

## 2024-01-11 NOTE — PATIENT INSTRUCTIONS
1.  Following surgery patient's eye symptoms and headache have resolved.  However he does have congestion and crusting.  Nasal rigid endoscopy was performed and cleaned the nasal cavity along with removal of the septal splints.  Patient now breathing much better.  Will have him continue the saline nasal spray.  2.  I will see him back in a month for another endoscopic cleaning with rigid nasal endoscopy.

## 2024-02-19 ENCOUNTER — OFFICE VISIT (OUTPATIENT)
Dept: OTOLARYNGOLOGY | Facility: CLINIC | Age: 29
End: 2024-02-19
Payer: COMMERCIAL

## 2024-02-19 VITALS — HEIGHT: 70 IN | WEIGHT: 215.2 LBS | BODY MASS INDEX: 30.81 KG/M2 | TEMPERATURE: 97.5 F

## 2024-02-19 DIAGNOSIS — J32.2 CHRONIC ETHMOIDAL SINUSITIS: Primary | ICD-10-CM

## 2024-02-19 PROCEDURE — 99024 POSTOP FOLLOW-UP VISIT: CPT | Performed by: OTOLARYNGOLOGY

## 2024-02-19 PROCEDURE — 31237 NSL/SINS NDSC SURG BX POLYPC: CPT | Performed by: OTOLARYNGOLOGY

## 2024-02-19 RX ORDER — LISDEXAMFETAMINE DIMESYLATE CAPSULES 40 MG/1
40 CAPSULE ORAL EVERY MORNING
COMMUNITY
Start: 2024-01-15

## 2024-02-19 NOTE — PROGRESS NOTES
Patient Name: Mario King   Visit Date: 02/19/2024   Patient ID: [id number]  Provider: Ortiz Wilcox MD    Sex: male  Location: [facility]   YOB: 1995  Location Address: [address] [place] [address], [address] [place] [address],?[place]?[id number]    Primary Care Provider Lisset López APRN  Location Phone: [phone]    Referring Provider: No ref. provider found        Chief Complaint  1 MONTH SINUS FOLLOW UP    History of Present Illness  Mario King is a 28 y.o. male who presents to [facility] for 1 MONTH SINUS FOLLOW UP.  Patient with complaints of chronic nasal congestion, deviated nasal septum, hipolito bullosa, right eye pain, also noted to have bilateral inferior turbinate hypertrophy and chronic sinusitis symptoms.  Patient underwent following procedures on 1/5/2024.    1.  SEPTOPLASTY  2.  BILATERAL INFERIOR TURBINATE SUBMUCOUS RESECTION WITH CAUTERIZATION AND OUT FRACTURE  3.  ENDOSCOPIC  SINUS SURGERY               A.  RIGHT MIDDLE HIPOLITO BULLECTOMY              B.  LEFT ANTERIOR PARTIAL ETHMOIDECTOMIES              C.  LEFT MAXILLARY ANTROSTOMY  4.  ADENOIDECTOMY    Patient is here for 1 month follow-up visit for repeat nasal endoscopy.  Patient reports no further headaches that he used to have and also sleeping much better with excellent nasal airway now.    Past Medical History:   Diagnosis Date    ADHD     Chronic nasal congestion        Past Surgical History:   Procedure Laterality Date    ENDOSCOPIC FUNCTIONAL SINUS SURGERY (FESS) Bilateral 1/5/2024    Procedure: ENDOSCOPIC  SINUS SURGERY WITH HIPOLITO BULLECTOMY, SEPTOPLASTY, BILATERAL INFERIOR TURBINATE SUBMUCOUS RESECTION WITH CAUTERIZATION,adenoidectomy;  Surgeon: Ortiz Wilcox MD;  Location: [facility] MAIN OR;  Service: ENT;  Laterality: Bilateral;    HERNIA REPAIR           Current Outpatient Medications:     amphetamine-dextroamphetamine (ADDERALL) 20 MG tablet, Take 1 tablet by mouth 2 (Two)  "Times a Day., Disp: , Rfl:     citalopram (CeleXA) 20 MG tablet, Take 1 tablet by mouth Daily., Disp: , Rfl:     prazosin (MINIPRESS) 2 MG capsule, Take 1 capsule by mouth every night at bedtime., Disp: , Rfl:     sodium chloride (OCEAN) 0.65 % nasal spray, 2-3 sprays into the nostril(s) as directed by provider As Needed (nasal irrigation)., Disp: 50 mL, Rfl: 12    HYDROcodone-acetaminophen (NORCO) 7.5-325 MG per tablet, Take 1 tablet by mouth Every 6 (Six) Hours As Needed for Moderate Pain (Pain)., Disp: 30 tablet, Rfl: 0    lisdexamfetamine (VYVANSE) 40 MG capsule, Take 1 capsule by mouth Every Morning (Patient not taking: Reported on 2/19/2024), Disp: , Rfl:      Allergies   Allergen Reactions    Ceclor [Cefaclor] Unknown - Low Severity     Not sure what his reaction is, had as a kid.       Social History     Tobacco Use    Smoking status: Never     Passive exposure: Past    Smokeless tobacco: Never   Vaping Use    Vaping Use: Never used   Substance Use Topics    Alcohol use: Yes     Comment: on occ    Drug use: Never        Objective     Vital Signs:   Vitals:    02/19/24 1709   Temp: 97.5 °F (36.4 °C)   TempSrc: Temporal   Weight: 97.6 kg (215 lb 3.2 oz)   Height: 177.8 cm (70\")       Tobacco Use: Low Risk  (2/19/2024)    Patient History     Smoking Tobacco Use: Never     Smokeless Tobacco Use: Never     Passive Exposure: Past         Physical Exam    Constitutional   Appearance  well developed, well-nourished, alert and in no acute distress, voice clear and strong    Head   Inspection  no deformities or lesions      Face   Inspection  no facial lesions; House-Brackmann I/VI bilaterally   Palpation  no TMJ crepitus nor  muscle tenderness bilaterally     Eyes/Vision   Visual Fields  extraocular movements are intact, no spontaneous or gaze-induced nystagmus  Conjunctivae  clear   Sclerae  clear   Pupils and Irises  pupils equal, round, and reactive to light.   Nystagmus  not present     Ears, Nose, " Mouth and Throat  Ears  External Ears  appearance within normal limits, no lesions present   Otoscopic Examination  tympanic membrane appearance within normal limits bilaterally without perforations, well-aerated middle ears   Hearing  intact to conversational voice both ears   Tunning fork testing    Rinne:  Barney:    Nose  External Nose  appearance normal   Intranasal Exam  mucosa within normal limits, vestibules normal, no intranasal lesions present, septum midline, sinuses non tender to percussion   Modified Pender Test:    Oral Cavity  Oral Mucosa  oral mucosa normal without pallor or cyanosis   Lips  lip appearance normal   Teeth  normal dentition for age   Gums  gums pink, non-swollen, no bleeding present   Tongue  tongue appearance normal; normal mobility   Palate  hard palate normal, soft palate appearance normal with symmetric mobility     Throat  Oropharynx  no inflammation or lesions present, tonsils within normal limits   Hypopharynx  appearance within normal limits   Larynx  voice normal     Neck  Inspection/Palpation  normal appearance, no masses or tenderness, trachea midline; thyroid size normal, nontender, no nodules or masses present on palpation     Lymphatic  Neck  no lymphadenopathy present   Supraclavicular Nodes  no lymphadenopathy present   Preauricular Nodes  no lymphadenopathy present     Respiratory  Respiratory Effort  breathing unlabored   Inspection of Chest  normal appearance, no retractions     Musculoskeletal   Cervical back: Normal range of motion and neck supple.      Skin and Subcutaneous Tissue  General Inspection  Regarding face and neck - there are no rashes present, no lesions present, and no areas of discoloration     Neurologic  Cranial Nerves  cranial nerves II-XII are grossly intact bilaterally   Gait and Station  normal gait, able to stand without diffculty    Psychiatric  Judgement and Insight  judgment and insight intact   Mood and Affect  mood normal, affect  "appropriate       RESULTS REVIEWED    I have reviewed the following information:   [x]  Previous Internal Note  []  Previous External Note:   [x]  Ordered Tests & Results:      Pathology:   Lab Results   Component Value Date    CASEREPORT  01/05/2024     Surgical Pathology Report                         Case: [id number]                                  Authorizing Provider:  Ortiz Wilcox MD         Collected:           01/05/2024 10:12 AM          Ordering Location:     [facility] MAIN Received:            01/08/2024 07:04 AM                                 OR                                                                           Pathologist:           Dawood Silver MD                                                            Specimens:   1) - Nose, septal bone,cartilage,bilateral sinus contents,turbinates                                2) - Adenoids, adenoids                                                                    CLININFO  01/05/2024     Chronic nasal congestion  Deviated nasal septum  Tita bullosa  Chronic sinusitis, unspecified location  Eye pain, right  Hypertrophy of both inferior nasal turbinates      FINALDX  01/05/2024     1. Septal bone, cartilage, bilateral sinus contents, and turbinates, removal:   - Unremarkable fragments of cartilage and bone   - Chronic rhinosinusitis      2. Adenoids, adenoidectomy:   - Reactive lymphoid hyperplasia    - Acute inflammation       GROSSDES  01/05/2024     1. Nose.  Received in formalin and labeled \"septal bone, cartilage, bilateral sinus contents, turbinates\" is a 4.5 cm aggregate of tan mucosa, cartilage, bone, and turbinate fragments.  Representative sections are submitted in 1 cassette following decalcification.    2. Adenoids.  Received in formalin and labeled \"adenoids\" is a 5 g, 3.0 x 3.0 x 1.0 cm aggregate of cauterized adenoid fragments.  Sectioning reveals a tan, soft, convoluted cut surface.  Representative sections are " "submitted in 1 cassette.   HAO      MICRO  01/05/2024     Microscopic examination performed.         No results found for: \"TSH\", \"T3FREE\", \"FREET4\", \"PTH\", \"THYROGLB\", \"CALCIUM\", \"MAHV45TR\", \"THYRSTIMIMMU\", \"THYROIDAB\"    No Images in the past 120 days found..    I have discussed the interpretation of the above results with the patient.    Nasal endoscopy with left nasal debridement    Date/Time: 2/19/2024 5:35 PM    Performed by: Ortiz Wilcox MD  Authorized by: Ortiz Wilcox MD    Consent:     Consent obtained:  Verbal    Consent given by:  Patient    Risks discussed:  Pain and bleeding    Alternatives discussed:  No treatment and delayed treatment  Procedure details:     Indications: post-operative debridement      Medication:  Afrin    The nose was examined and debrided using a rigid nasal endoscopy and suction and Ernst- Blakesley forcep      Debridement location:  Left nasal debridement  Post-procedure details:     Patient tolerance of procedure:  Tolerated well  Comments:      After topical anesthetic application along with decongestant, rigid nasal endoscopy was performed.  Left ethmoid sinus crusting was cleaned.  Otherwise there was also a little bit of clots in the inferior nasal cavity which was removed as well.  Then attention was directed right side and right nasal cavity crusting was removed otherwise right middle hi bullectomy site has healed very well.  Patient tolerated procedure well            Assessment and Plan   Diagnoses and all orders for this visit:    1. Chronic ethmoidal sinusitis (Primary)  -     $ Nasal Endsocopy with Debridement        Mario King  reports that he has never smoked. He has been exposed to tobacco smoke. He has never used smokeless tobacco.    Plan:  Patient Instructions   1.  Patient's sinuses are healing nicely and nasal cavity is wide open with septum in midline.  Patient is asked to continue the saline nasal spray and humidifier at bedside.  2.  I will see " patient back in 1 month with final rigid nasal Endo and hopefully all the mucosa will be healed by then.     15 minutes were spent caring for Mario on this date of service. This time spent by me includes preparing for the visit, reviewing tests, obtaining/reviewing separately obtained history, performing medically appropriate exam/evaluation, counseling/educating the patient/family/caregiver, ordering medications/tests/procedures, referring/communicating with other health care professionals, documenting information in the medical record, independently interpreting results and communicating that with the patient/family/caregiver and/or care coordination.       Follow Up   Return in about 1 month (around 3/19/2024), or Follow-up 1 month with rigid nasal Endo.  Patient was given instructions and counseling regarding his condition or for health maintenance advice. Please see specific information pulled into the AVS if appropriate.

## 2024-02-19 NOTE — PATIENT INSTRUCTIONS
1.  Patient's sinuses are healing nicely and nasal cavity is wide open with septum in midline.  Patient is asked to continue the saline nasal spray and humidifier at bedside.  2.  I will see patient back in 1 month with final rigid nasal Endo and hopefully all the mucosa will be healed by then.

## 2024-04-24 ENCOUNTER — OFFICE VISIT (OUTPATIENT)
Dept: OTOLARYNGOLOGY | Facility: CLINIC | Age: 29
End: 2024-04-24
Payer: COMMERCIAL

## 2024-04-24 VITALS — BODY MASS INDEX: 30.49 KG/M2 | HEIGHT: 70 IN | TEMPERATURE: 97 F | WEIGHT: 213 LBS

## 2024-04-24 DIAGNOSIS — J32.2 CHRONIC ETHMOIDAL SINUSITIS: ICD-10-CM

## 2024-04-24 DIAGNOSIS — J32.9 CHRONIC SINUSITIS, UNSPECIFIED LOCATION: Primary | ICD-10-CM

## 2024-04-24 DIAGNOSIS — J30.89 ALLERGIC RHINITIS DUE TO OTHER ALLERGIC TRIGGER, UNSPECIFIED SEASONALITY: ICD-10-CM

## 2024-04-24 RX ORDER — BICTEGRAVIR SODIUM, EMTRICITABINE, AND TENOFOVIR ALAFENAMIDE FUMARATE 50; 200; 25 MG/1; MG/1; MG/1
1 TABLET ORAL DAILY
COMMUNITY
Start: 2024-03-29

## 2024-04-24 RX ORDER — FLUTICASONE PROPIONATE 50 MCG
2 SPRAY, SUSPENSION (ML) NASAL DAILY
Qty: 16 G | Refills: 6 | Status: SHIPPED | OUTPATIENT
Start: 2024-04-24 | End: 2024-04-24

## 2024-04-24 RX ORDER — FLUTICASONE PROPIONATE 50 MCG
2 SPRAY, SUSPENSION (ML) NASAL DAILY
Qty: 16 G | Refills: 6 | Status: SHIPPED | OUTPATIENT
Start: 2024-04-24

## 2024-04-24 RX ORDER — EMTRICITABINE AND TENOFOVIR ALAFENAMIDE 200; 25 MG/1; MG/1
1 TABLET ORAL DAILY
COMMUNITY
Start: 2024-03-14

## 2024-04-24 RX ORDER — DEXTROAMPHETAMINE SACCHARATE, AMPHETAMINE ASPARTATE MONOHYDRATE, DEXTROAMPHETAMINE SULFATE AND AMPHETAMINE SULFATE 6.25; 6.25; 6.25; 6.25 MG/1; MG/1; MG/1; MG/1
50 CAPSULE, EXTENDED RELEASE ORAL EVERY MORNING
COMMUNITY
Start: 2024-04-12

## 2024-04-24 NOTE — PATIENT INSTRUCTIONS
1.  Patient has done well and at this point he is completely healed from surgery with no exposed bone or crusting.  2.  Patient has allergies which I asked him to continue the antihistamine and during season especially along with nasal spray and I am going to order Flonase.  3.  I will see patient as needed.

## 2024-04-24 NOTE — PROGRESS NOTES
Patient Name: Mario King   Visit Date: 04/24/2024   Patient ID: [id number]  Provider: Ortiz Wilcox MD    Sex: male  Location: [facility]   YOB: 1995  Location Address: [address] [place] [address], [address] [place] [address],?[place]?[id number]    Primary Care Provider Lisset López APRN  Location Phone: [phone]    Referring Provider: No ref. provider found        Chief Complaint  NASAL ENDOSCOPY FOLLOW UP    History of Present Illness  Mario King is a 28 y.o. male who presents to [facility] for NASAL ENDOSCOPY FOLLOW UP.  Patient with complaints of chronic nasal congestion, deviated nasal septum, hipolito bullosa, right eye pain, also noted to have bilateral inferior turbinate hypertrophy and chronic sinusitis symptoms.  Patient underwent following procedures on 1/5/2024.     1.  SEPTOPLASTY  2.  BILATERAL INFERIOR TURBINATE SUBMUCOUS RESECTION WITH CAUTERIZATION AND OUT FRACTURE  3.  ENDOSCOPIC  SINUS SURGERY               A.  RIGHT MIDDLE HIPOLITO BULLECTOMY              B.  LEFT ANTERIOR PARTIAL ETHMOIDECTOMIES              C.  LEFT MAXILLARY ANTROSTOMY  4.  ADENOIDECTOMY     Patient is here for 3 month follow-up visit for repeat nasal endoscopy.  Patient reports no further headaches that he used to have and also sleeping much better with excellent nasal airway now.    Past Medical History:   Diagnosis Date    ADHD     Chronic nasal congestion        Past Surgical History:   Procedure Laterality Date    ENDOSCOPIC FUNCTIONAL SINUS SURGERY (FESS) Bilateral 1/5/2024    Procedure: ENDOSCOPIC  SINUS SURGERY WITH HIPOLITO BULLECTOMY, SEPTOPLASTY, BILATERAL INFERIOR TURBINATE SUBMUCOUS RESECTION WITH CAUTERIZATION,adenoidectomy;  Surgeon: Ortiz Wilcox MD;  Location: [facility] MAIN OR;  Service: ENT;  Laterality: Bilateral;    HERNIA REPAIR           Current Outpatient Medications:     amphetamine-dextroamphetamine XR (ADDERALL XR) 25 MG 24 hr capsule, Take 2  "capsules by mouth Every Morning, Disp: , Rfl:     Biktarvy -25 MG per tablet, Take 1 tablet by mouth Daily., Disp: , Rfl:     citalopram (CeleXA) 20 MG tablet, Take 1 tablet by mouth Daily., Disp: , Rfl:     Descovy 200-25 MG per tablet, Take 1 tablet by mouth Daily., Disp: , Rfl:     HYDROcodone-acetaminophen (NORCO) 7.5-325 MG per tablet, Take 1 tablet by mouth Every 6 (Six) Hours As Needed for Moderate Pain (Pain)., Disp: 30 tablet, Rfl: 0    prazosin (MINIPRESS) 2 MG capsule, Take 1 capsule by mouth every night at bedtime., Disp: , Rfl:     sodium chloride (OCEAN) 0.65 % nasal spray, 2-3 sprays into the nostril(s) as directed by provider As Needed (nasal irrigation)., Disp: 50 mL, Rfl: 12    amphetamine-dextroamphetamine (ADDERALL) 20 MG tablet, Take 1 tablet by mouth 2 (Two) Times a Day. (Patient not taking: Reported on 4/24/2024), Disp: , Rfl:     fluticasone (FLONASE) 50 MCG/ACT nasal spray, 2 sprays into the nostril(s) as directed by provider Daily. Administer 2 sprays in each nostril for each dose., Disp: 16 g, Rfl: 6    lisdexamfetamine (VYVANSE) 40 MG capsule, Take 1 capsule by mouth Every Morning (Patient not taking: Reported on 2/19/2024), Disp: , Rfl:      Allergies   Allergen Reactions    Ceclor [Cefaclor] Unknown - Low Severity     Not sure what his reaction is, had as a kid.       Social History     Tobacco Use    Smoking status: Never     Passive exposure: Past    Smokeless tobacco: Never   Vaping Use    Vaping status: Never Used   Substance Use Topics    Alcohol use: Yes     Comment: on occ    Drug use: Never        Objective     Vital Signs:   Vitals:    04/24/24 0800   Temp: 97 °F (36.1 °C)   TempSrc: Temporal   Weight: 96.6 kg (213 lb)   Height: 177.8 cm (70\")       Tobacco Use: Low Risk  (4/24/2024)    Patient History     Smoking Tobacco Use: Never     Smokeless Tobacco Use: Never     Passive Exposure: Past         Physical Exam    Constitutional   Appearance  well developed, " well-nourished, alert and in no acute distress, voice clear and strong    Head   Inspection  no deformities or lesions      Face   Inspection  no facial lesions; House-Brackmann I/VI bilaterally   Palpation  no TMJ crepitus nor  muscle tenderness bilaterally     Eyes/Vision   Visual Fields  extraocular movements are intact, no spontaneous or gaze-induced nystagmus  Conjunctivae  clear   Sclerae  clear   Pupils and Irises  pupils equal, round, and reactive to light.   Nystagmus  not present     Ears, Nose, Mouth and Throat  Ears  External Ears  appearance within normal limits, no lesions present   Otoscopic Examination  tympanic membrane appearance within normal limits bilaterally without perforations, well-aerated middle ears   Hearing  intact to conversational voice both ears   Tunning fork testing    Rinne:  Barney:    Nose  External Nose  appearance normal   Intranasal Exam  mucosa within normal limits, vestibules normal, no intranasal lesions present, septum midline, sinuses non tender to percussion   Modified Rita Test:    Oral Cavity  Oral Mucosa  oral mucosa normal without pallor or cyanosis   Lips  lip appearance normal   Teeth  normal dentition for age   Gums  gums pink, non-swollen, no bleeding present   Tongue  tongue appearance normal; normal mobility   Palate  hard palate normal, soft palate appearance normal with symmetric mobility     Throat  Oropharynx  no inflammation or lesions present, tonsils within normal limits   Hypopharynx  appearance within normal limits   Larynx  voice normal     Neck  Inspection/Palpation  normal appearance, no masses or tenderness, trachea midline; thyroid size normal, nontender, no nodules or masses present on palpation     Lymphatic  Neck  no lymphadenopathy present   Supraclavicular Nodes  no lymphadenopathy present   Preauricular Nodes  no lymphadenopathy present     Respiratory  Respiratory Effort  breathing unlabored   Inspection of Chest  normal  appearance, no retractions     Musculoskeletal   Cervical back: Normal range of motion and neck supple.      Skin and Subcutaneous Tissue  General Inspection  Regarding face and neck - there are no rashes present, no lesions present, and no areas of discoloration     Neurologic  Cranial Nerves  cranial nerves II-XII are grossly intact bilaterally   Gait and Station  normal gait, able to stand without diffculty    Psychiatric  Judgement and Insight  judgment and insight intact   Mood and Affect  mood normal, affect appropriate       RESULTS REVIEWED    I have reviewed the following information:   [x]  Previous Internal Note  []  Previous External Note:   [x]  Ordered Tests & Results:      Pathology:   Lab Results   Component Value Date    CASEREPORT  01/05/2024     Surgical Pathology Report                         Case: [id number]                                  Authorizing Provider:  Ortiz Wilcox MD         Collected:           01/05/2024 10:12 AM          Ordering Location:     [facility] MAIN Received:            01/08/2024 07:04 AM                                 OR                                                                           Pathologist:           Dawood Silver MD                                                            Specimens:   1) - Nose, septal bone,cartilage,bilateral sinus contents,turbinates                                2) - Adenoids, adenoids                                                                    CLININFO  01/05/2024     Chronic nasal congestion  Deviated nasal septum  Tita bullosa  Chronic sinusitis, unspecified location  Eye pain, right  Hypertrophy of both inferior nasal turbinates      FINALDX  01/05/2024     1. Septal bone, cartilage, bilateral sinus contents, and turbinates, removal:   - Unremarkable fragments of cartilage and bone   - Chronic rhinosinusitis      2. Adenoids, adenoidectomy:   - Reactive lymphoid hyperplasia    - Acute inflammation     "   GROSSDES  01/05/2024     1. Nose.  Received in formalin and labeled \"septal bone, cartilage, bilateral sinus contents, turbinates\" is a 4.5 cm aggregate of tan mucosa, cartilage, bone, and turbinate fragments.  Representative sections are submitted in 1 cassette following decalcification.    2. Adenoids.  Received in formalin and labeled \"adenoids\" is a 5 g, 3.0 x 3.0 x 1.0 cm aggregate of cauterized adenoid fragments.  Sectioning reveals a tan, soft, convoluted cut surface.  Representative sections are submitted in 1 cassette.   HAO      MICRO  01/05/2024     Microscopic examination performed.         No results found for: \"TSH\", \"T3FREE\", \"FREET4\", \"PTH\", \"THYROGLB\", \"CALCIUM\", \"HRRD75LC\", \"THYRSTIMIMMU\", \"THYROIDAB\"    No Images in the past 120 days found..    I have discussed the interpretation of the above results with the patient.    Nasal endoscopy    Date/Time: 4/24/2024 8:17 AM    Performed by: Ortiz Wilcox MD  Authorized by: Ortiz Wilcox MD    Consent:     Consent obtained:  Verbal    Consent given by:  Patient    Risks discussed:  Bleeding and pain    Alternatives discussed:  No treatment and delayed treatment  Procedure details:     Medication:  Afrin and Vincent-Synephrine 1%    Instrument: rigid nasal endoscopy      Scope location: bilateral nare    Post-procedure details:     Patient tolerance of procedure:  Tolerated well  Comments:      After topical anesthetic and decongestant application, rigid nasal endoscopy was performed in both nostrils.  Septum is nicely midline with excellent airway inferiorly with healing of the inferior turbinates.  Otherwise left middle meatus into the ethmoid cavities are nice and clear and healed.  Maxillary antrostomy site is open and also frontal recesses as well as nasofrontal duct is wide open.  On the right side the middle turbinate and middle meatus is nice and clear and healed well.  The right nasal airway is also unremarkable.            Assessment and Plan "   Diagnoses and all orders for this visit:    1. Chronic sinusitis, unspecified location (Primary)  -     $ Nasal / Sinus Endoscopy  -     fluticasone (FLONASE) 50 MCG/ACT nasal spray; 2 sprays into the nostril(s) as directed by provider Daily. Administer 2 sprays in each nostril for each dose.  Dispense: 16 g; Refill: 6    2. Chronic ethmoidal sinusitis  -     $ Nasal / Sinus Endoscopy    3. Allergic rhinitis due to other allergic trigger, unspecified seasonality        Mario King  reports that he has never smoked. He has been exposed to tobacco smoke. He has never used smokeless tobacco.     Plan:  Patient Instructions   1.  Patient has done well and at this point he is completely healed from surgery with no exposed bone or crusting.  2.  Patient has allergies which I asked him to continue the antihistamine and during season especially along with nasal spray and I am going to order Xhance.  3.  I will see patient as needed.      Follow Up   Return if symptoms worsen or fail to improve.  Patient was given instructions and counseling regarding his condition or for health maintenance advice. Please see specific information pulled into the AVS if appropriate.

## (undated) DEVICE — Device

## (undated) DEVICE — MEDI-VAC NON-CONDUCTIVE SUCTION TUBING: Brand: CARDINAL HEALTH

## (undated) DEVICE — COAGULATOR SXN FTSWTCH 10F6IN

## (undated) DEVICE — DUAL LUMEN STOMACH TUBE,ANTI-REFLUX VALVE: Brand: SALEM SUMP

## (undated) DEVICE — SYR LUERLOK 30CC

## (undated) DEVICE — TOWEL,OR,DSP,ST,BLUE,STD,4/PK,20PK/CS: Brand: MEDLINE

## (undated) DEVICE — DRSNG TELFA PAD NONADH STR 1S 3X4IN

## (undated) DEVICE — SOL IRR NACL 0.9PCT 3000ML

## (undated) DEVICE — SPNG GZ WOVN 8PLY 4X4IN LF STRL PK/2

## (undated) DEVICE — SPLINT 1522000 20PK PAIR SIMPLESPLINTS

## (undated) DEVICE — GAUZE,SPONGE,4"X4",16PLY,STRL,LF,10/TRAY: Brand: MEDLINE

## (undated) DEVICE — VAGINAL PACKING: Brand: DEROYAL

## (undated) DEVICE — SUT GUT CHRM 4/0 1X12IN BRN 752G

## (undated) DEVICE — SUT SILK 2/0 FS BLK 18IN 685G

## (undated) DEVICE — SUT PLAIN 1 4/0 1828H

## (undated) DEVICE — ENT-LF: Brand: MEDLINE INDUSTRIES, INC.

## (undated) DEVICE — SPONGE,NEURO,0.5"X3",XR,STRL,LF,10/PK: Brand: MEDLINE

## (undated) DEVICE — SLV SCD KN/LEN ADJ EXPRSS BLENDED MD 1P/U

## (undated) DEVICE — SUT PROLN CARDIO V5 3/0 36IN 8936H

## (undated) DEVICE — BLADE 1884004 TRICUT 5PK 4MM: Brand: TRICUT®

## (undated) DEVICE — CYSTO/BLADDER IRRIGATION SET, REGULATING CLAMP

## (undated) DEVICE — KT ANTI FOG W/FLD AND SPNG

## (undated) DEVICE — GLV SURG BIOGEL LTX PF 7 1/2